# Patient Record
Sex: FEMALE | Race: WHITE
[De-identification: names, ages, dates, MRNs, and addresses within clinical notes are randomized per-mention and may not be internally consistent; named-entity substitution may affect disease eponyms.]

---

## 2017-08-07 ENCOUNTER — HOSPITAL ENCOUNTER (OUTPATIENT)
Dept: HOSPITAL 47 - RADMAMWWP | Age: 62
Discharge: HOME | End: 2017-08-07
Payer: COMMERCIAL

## 2017-08-07 DIAGNOSIS — Z12.31: Primary | ICD-10-CM

## 2017-08-08 NOTE — MM
Reason for exam: screening  (asymptomatic).

Last mammogram was performed 1 year and 7 months ago.



History:

Patient is postmenopausal.

Took hormonal contraceptives for 2 years.



Physical Findings:

A clinical breast exam by your physician is recommended on an annual basis and 

results should be correlated with mammographic findings.



MG Screening Mammo w CAD

Bilateral CC and MLO view(s) were taken.

Prior study comparison: January 20, 2016, bilateral MG screening mammo w CAD.  

November 27, 2013, bilateral digital screening mammo w/CAD.

The breast tissue is heterogeneously dense. This may lower the sensitivity of 

mammography.

Finding: There are typically benign dystrophic, round calcifications in both 

breasts. Asymmetric breast tissue in the right posterior upper outer quadrant is 

stable. There is no discrete abnormality.





ASSESSMENT: Benign, BI-RAD 2



RECOMMENDATION:

Routine screening mammogram of both breasts in 1 year.

## 2018-08-08 ENCOUNTER — HOSPITAL ENCOUNTER (OUTPATIENT)
Dept: HOSPITAL 47 - RADMAMWWP | Age: 63
Discharge: HOME | End: 2018-08-08
Attending: FAMILY MEDICINE
Payer: COMMERCIAL

## 2018-08-08 DIAGNOSIS — Z12.31: Primary | ICD-10-CM

## 2018-08-08 PROCEDURE — 77067 SCR MAMMO BI INCL CAD: CPT

## 2018-08-08 NOTE — MM
Reason for exam: screening  (asymptomatic).

Last mammogram was performed 1 year ago.



History:

Patient is postmenopausal.

Took hormonal contraceptives for 2 years.



Physical Findings:

A clinical breast exam by your physician is recommended on an annual basis and 

results should be correlated with mammographic findings.



MG Screening Mammo w CAD

Bilateral CC and MLO view(s) were taken.

Prior study comparison: August 7, 2017, bilateral MG screening mammo w CAD.  

January 20, 2016, bilateral MG screening mammo w CAD.

The breast tissue is heterogeneously dense. This may lower the sensitivity of 

mammography.  There is benign appearing round calcifications bilaterally. There is

no discrete abnormality.





ASSESSMENT: Benign, BI-RAD 2



RECOMMENDATION:

Routine screening mammogram of both breasts in 1 year.

## 2019-08-12 ENCOUNTER — HOSPITAL ENCOUNTER (OUTPATIENT)
Dept: HOSPITAL 47 - RADMAMWWP | Age: 64
Discharge: HOME | End: 2019-08-12
Attending: FAMILY MEDICINE
Payer: MEDICARE

## 2019-08-12 DIAGNOSIS — Z78.0: ICD-10-CM

## 2019-08-12 DIAGNOSIS — Z12.31: Primary | ICD-10-CM

## 2019-08-12 DIAGNOSIS — M85.851: ICD-10-CM

## 2019-08-12 PROCEDURE — 77067 SCR MAMMO BI INCL CAD: CPT

## 2019-08-12 PROCEDURE — 77063 BREAST TOMOSYNTHESIS BI: CPT

## 2019-08-12 PROCEDURE — 77080 DXA BONE DENSITY AXIAL: CPT

## 2019-08-12 NOTE — BD
EXAMINATION TYPE: Axial Bone Density

 

DATE OF EXAM: 8/12/2019

 

COMPARISON: 2013

 

CLINICAL HISTORY: 

 

Height:  62.5

Weight:  122.5

 

FRAX RISK QUESTIONS:

Alcohol (3 or more units per day):  no

Family History (Parent hip fracture):  no

Glucocorticoids (More than 3mos):  no

           (Ex: prednisone, prednisolone, methylprednisolone, dexamethasone, and hydrocortisone).    
     

History of Fracture in Adulthood: no

Secondary Osteoporosis: 

  1.  Type 1 Diabetes: no

  2.  Hyperthyroidism: no

  3.  Menopause before 45: no

  4.  Malnutrition: no

  5.  Chronic liver disease: no

Rheumatoid Arthritis: no

Current Tobacco Use: yes

 

RISK FACTORS 

HISTORY OF: 

Family History of Osteoporosis: not to knowledge of patient

Active: yes

Diet low in dairy products/other sources of calcium:  at least one serving a day

Postmenopausal woman: yes

Take estrogen and/or progesterone medications: not now

How long: hormonal contraceptives about 2 years

Lost more than 2 inches in height since high school: no

 

Frequent falls: no

Poor Health: no

Hyperparathyroidism: no

Adrenal Insufficiency: no

 

MEDICATIONS: 

Thyroid Medications:  no

Osteoporosis Medications: no

Additional Medications: multivitamin,  blood pressure med 

 

 

Additional History:

 

 

EXAM MEASUREMENTS: 

Bone mineral densitometry was performed using the [employer] System.

Bone mineral density as measured about the Lumbar spine is:  

----- L1-L4(G/cm2): 1.329

T Score Values are as follows:

----- L2: 1.5

----- L3: 0.6

----- L4: 2.2

----- L1-L4: 1.2

Bone mineral density has: Decreased -6.2% since study of: 11/27/2013

 

Bone mineral density about the R hip (g/cm2): 0.862

Bone mineral density about the L hip (g/cm2): 0.908

T Score values are as follows:

-----R Neck: -1.3

-----L Neck: -0.9

-----R Total: -1.2

-----L Total: -0.9

Bone mineral density has: Decreased -6.9% since study of: 11/27/2013

 

 

IMPRESSION:

Osteopenia right hip.

 

 

 

 

 

NOTE:  T-SCORE=SD OF THE YOUNG ADULT MEAN.

## 2019-08-13 NOTE — MM
Reason for exam: screening  (asymptomatic).

Last mammogram was performed 1 year ago.



History:

Patient is postmenopausal.

Took hormonal contraceptives for 2 years.



Physical Findings:

A clinical breast exam by your physician is recommended on an annual basis and 

results should be correlated with mammographic findings.



MG 3D Screening Mammo W/Cad

Bilateral CC and MLO view(s) were taken.

Prior study comparison: August 8, 2018, bilateral MG screening mammo w CAD.  

August 7, 2017, bilateral MG screening mammo w CAD.

The breast tissue is heterogeneously dense. This may lower the sensitivity of 

mammography.  There is no discrete abnormality.  No significant changes when 

compared with prior studies.





ASSESSMENT: Benign, BI-RAD 2



RECOMMENDATION:

Routine screening mammogram of both breasts in 1 year.

## 2020-05-14 ENCOUNTER — HOSPITAL ENCOUNTER (OUTPATIENT)
Dept: HOSPITAL 47 - PROCWHC3 | Age: 65
Discharge: HOME | End: 2020-05-14
Attending: INTERNAL MEDICINE
Payer: MEDICARE

## 2020-05-14 VITALS
HEART RATE: 81 BPM | SYSTOLIC BLOOD PRESSURE: 155 MMHG | RESPIRATION RATE: 16 BRPM | DIASTOLIC BLOOD PRESSURE: 82 MMHG | TEMPERATURE: 97.6 F

## 2020-05-14 DIAGNOSIS — C25.9: Primary | ICD-10-CM

## 2020-05-14 LAB
ALBUMIN SERPL-MCNC: 3 G/DL (ref 3.5–5)
ALP SERPL-CCNC: 155 U/L (ref 38–126)
ALT SERPL-CCNC: 14 U/L (ref 4–34)
ANION GAP SERPL CALC-SCNC: 10 MMOL/L
AST SERPL-CCNC: 29 U/L (ref 14–36)
BASOPHILS # BLD AUTO: 0.1 K/UL (ref 0–0.2)
BASOPHILS NFR BLD AUTO: 1 %
BUN SERPL-SCNC: 3 MG/DL (ref 7–17)
CALCIUM SPEC-MCNC: 8.4 MG/DL (ref 8.4–10.2)
CHLORIDE SERPL-SCNC: 99 MMOL/L (ref 98–107)
CO2 SERPL-SCNC: 26 MMOL/L (ref 22–30)
EOSINOPHIL # BLD AUTO: 0.2 K/UL (ref 0–0.7)
EOSINOPHIL NFR BLD AUTO: 2 %
ERYTHROCYTE [DISTWIDTH] IN BLOOD BY AUTOMATED COUNT: 3.5 M/UL (ref 3.8–5.4)
ERYTHROCYTE [DISTWIDTH] IN BLOOD: 14.4 % (ref 11.5–15.5)
GLUCOSE SERPL-MCNC: 156 MG/DL (ref 74–99)
HCT VFR BLD AUTO: 32 % (ref 34–46)
HGB BLD-MCNC: 10 GM/DL (ref 11.4–16)
LYMPHOCYTES # SPEC AUTO: 1.1 K/UL (ref 1–4.8)
LYMPHOCYTES NFR SPEC AUTO: 10 %
MAGNESIUM SPEC-SCNC: 1.6 MG/DL (ref 1.6–2.3)
MCH RBC QN AUTO: 28.5 PG (ref 25–35)
MCHC RBC AUTO-ENTMCNC: 31.1 G/DL (ref 31–37)
MCV RBC AUTO: 91.6 FL (ref 80–100)
MONOCYTES # BLD AUTO: 0.3 K/UL (ref 0–1)
MONOCYTES NFR BLD AUTO: 3 %
NEUTROPHILS # BLD AUTO: 9.5 K/UL (ref 1.3–7.7)
NEUTROPHILS NFR BLD AUTO: 84 %
PLATELET # BLD AUTO: 518 K/UL (ref 150–450)
POTASSIUM SERPL-SCNC: 3.8 MMOL/L (ref 3.5–5.1)
PROT SERPL-MCNC: 5.7 G/DL (ref 6.3–8.2)
SODIUM SERPL-SCNC: 135 MMOL/L (ref 137–145)
WBC # BLD AUTO: 11.3 K/UL (ref 3.8–10.6)

## 2020-05-14 PROCEDURE — 36591 DRAW BLOOD OFF VENOUS DEVICE: CPT

## 2020-05-14 PROCEDURE — 80053 COMPREHEN METABOLIC PANEL: CPT

## 2020-05-14 PROCEDURE — 85025 COMPLETE CBC W/AUTO DIFF WBC: CPT

## 2020-05-14 PROCEDURE — 83735 ASSAY OF MAGNESIUM: CPT

## 2020-08-22 ENCOUNTER — HOSPITAL ENCOUNTER (OUTPATIENT)
Dept: HOSPITAL 47 - RADPETMAIN | Age: 65
Discharge: HOME | End: 2020-08-22
Attending: INTERNAL MEDICINE
Payer: MEDICARE

## 2020-08-22 DIAGNOSIS — C25.8: Primary | ICD-10-CM

## 2020-08-22 PROCEDURE — 78815 PET IMAGE W/CT SKULL-THIGH: CPT

## 2020-08-24 NOTE — PE
EXAMINATION TYPE: PET CT fusion skull to thigh

 

DATE OF EXAM: 8/22/2020

 

COMPARISON: Outside CT abdomen and pelvis February 24, 2020.

 

HISTORY: Pancreatic cancer progress study.   Originally diagnosed in February, completed chemotherapy
 treatment Rosalee 15, 2020.

 

TECHNIQUE:  Following the intravenous administration of 10.16 mCi of F-18 FDG, whole body images are 
performed from the skull base to the midthigh.  Images are reviewed on the computer in the coronal, a
xial, and sagittal planes.  Reconstructed rotating images are created on independent workstation and 
reviewed on the computer.   A noncontrast CT is performed in conjunction with the PET scan.

 

SCAN: Subsequent Scan

 

FINDINGS: 

 

SKULL BASE AND NECK:  No areas of abnormal hypermetabolic uptake.

 

CHEST, MEDIASTINUM, AND HILAR REGION: Background mild underlying emphysematous change. No areas of ab
normal hypermetabolic uptake.

 

ABDOMEN AND PELVIS: Interval placement of metallic internal biliary stent with new pneumobilia. Inter
jazmyne improvement in degree of biliary and pancreatic ductal dilatation. Persistent abnormal soft tissu
e medially or deep to the stent with hypermetabolic uptake near axial image 141 through 147, difficul
t to accurately measure due to isointensity relative to adjacent pancreatic tissue, likely measuring 
2 to 2.5 cm in both AP and transverse dimensions. Max SUV is at this level is 3.71 on axial image 146
.

 

Normal excretion is present. No additional areas of suspicious hypermetabolic uptake.

 

OSSEOUS STRUCTURES: No areas of suspicious hypermetabolic uptake.

 

OTHER CT: There is right internal jugular Mediport catheter terminating near the cavoatrial junction.
 Mild to moderate right greater than left carotid bulb vascular calcification.

 

Ascending aortic aneurysm up to 4.0 cm axial image 85. Coronary artery calcification is present.

 

There is 2.6 cm simple appearing thin-walled cyst medially left kidney assessment 140 incidentally no
josué. There is ventral wall hernia containing fat and fluid above the umbilicus axial image 148. There
 is small fat-containing periumbilical hernia axial image 161. Sigmoid colonic diverticulosis. Scatte
red pelvic phleboliths.

 

IMPRESSION: Persistent active neoplasm at level of head/uncinate process. No new metastatic disease i
s evident. New Metallic internal biliary stent is patent with improved ductal distention.

## 2020-11-04 ENCOUNTER — HOSPITAL ENCOUNTER (OUTPATIENT)
Dept: HOSPITAL 47 - RADCTMAIN | Age: 65
Discharge: HOME | End: 2020-11-04
Attending: INTERNAL MEDICINE
Payer: MEDICARE

## 2020-11-04 DIAGNOSIS — Z96.89: ICD-10-CM

## 2020-11-04 DIAGNOSIS — K86.89: Primary | ICD-10-CM

## 2020-11-04 DIAGNOSIS — C25.9: ICD-10-CM

## 2020-11-04 LAB — BUN SERPL-SCNC: 15 MG/DL (ref 7–17)

## 2020-11-04 PROCEDURE — 84520 ASSAY OF UREA NITROGEN: CPT

## 2020-11-04 PROCEDURE — 71260 CT THORAX DX C+: CPT

## 2020-11-04 PROCEDURE — 82565 ASSAY OF CREATININE: CPT

## 2020-11-04 PROCEDURE — 36415 COLL VENOUS BLD VENIPUNCTURE: CPT

## 2020-11-04 PROCEDURE — 74177 CT ABD & PELVIS W/CONTRAST: CPT

## 2021-03-25 ENCOUNTER — HOSPITAL ENCOUNTER (OUTPATIENT)
Dept: HOSPITAL 47 - RADECHMAIN | Age: 66
Discharge: HOME | End: 2021-03-25
Attending: INTERNAL MEDICINE
Payer: MEDICARE

## 2021-03-25 DIAGNOSIS — Z01.818: Primary | ICD-10-CM

## 2021-03-25 DIAGNOSIS — I08.1: ICD-10-CM

## 2021-03-25 PROCEDURE — 93306 TTE W/DOPPLER COMPLETE: CPT

## 2021-03-26 NOTE — ECHOF
Referral Reason:[id number]



MEASUREMENTS

--------

HEIGHT: 157.5 cm

WEIGHT: 47.6 kg

BP: 

IVSd:   1.2 cm     (0.6 - 1.1)

LVIDd:   3.8 cm     (3.9 - 5.3)

LVPWd:   1.2 cm     (0.6 - 1.1)

IVSs:   1.3 cm

LVIDs:   2.5 cm

LVPWs:   1.5 cm

LAESV Index (A-L):   18.72 ml/m

Ao Diam:   3.2 cm     (2.0 - 3.7)

AV Cusp:   1.9 cm     (1.5 - 2.6)

LA Diam:   2.3 cm     (2.7 - 3.8)

MV EXCURSION:   11.800 mm     (> 18.000)

MV EF SLOPE:   66 mm/s     (70 - 150)

EPSS:   1.6 cm

MV E Clarence:   0.44 m/s

MV DecT:   206 ms

MV A Clarence:   0.69 m/s

MV E/A Ratio:   0.64 

RAP:   5.00 mmHg

RVSP:   13.29 mmHg







FINDINGS

--------

This was a technically good study.

The left ventricular size is normal.   There is borderline concentric left ventricular hypertrophy.  
 Overall left ventricular systolic function is low-normal with, an EF between 50 - 55 %.   The diasto
lic filling pattern is normal for the age of the patient 6.77.

The right ventricle is normal in size.

The left atrial size is normal.    Normal LA  size by volume 22+/-6 ml/m2.

The right atrial size is normal.

Interatrial and interventricular septum intact.

The aortic valve is trileaflet and appears structurally normal.

The mitral valve is normal.   The mitral valve leaflets are mildly thickened.   There is trace mitral
 regurgitation.

The tricuspid valve appears structurally normal.   Trace tricuspid regurgitation present.   Right robin
tricular systolic pressure is normal at < 35 mmHg.

There is no pulmonic regurgitation present.

The aortic root size is normal.

Normal inferior vena cava with normal inspiratory collapse consistent with estimated right atrial pre
ssure of  5 mmHg.

There is no pericardial effusion.



CONCLUSIONS

--------

1. The left ventricular size is normal.

2. There is borderline concentric left ventricular hypertrophy.

3. Overall left ventricular systolic function is low-normal with, an EF between 50 - 55 %.

4. The diastolic filling pattern is normal for the age of the patient 6.77

5. The mitral valve leaflets are mildly thickened.

6. There is trace mitral regurgitation.

7. Trace tricuspid regurgitation present.

8. There is no pericardial effusion.





SONOGRAPHER: Mandy Hector RDCS

## 2021-07-08 ENCOUNTER — HOSPITAL ENCOUNTER (OUTPATIENT)
Dept: HOSPITAL 47 - PROCWHC3 | Age: 66
End: 2021-07-08
Attending: INTERNAL MEDICINE
Payer: MEDICARE

## 2021-07-08 VITALS
HEART RATE: 72 BPM | DIASTOLIC BLOOD PRESSURE: 91 MMHG | SYSTOLIC BLOOD PRESSURE: 147 MMHG | RESPIRATION RATE: 16 BRPM | TEMPERATURE: 97.9 F

## 2021-07-08 DIAGNOSIS — E83.51: ICD-10-CM

## 2021-07-08 DIAGNOSIS — D64.81: Primary | ICD-10-CM

## 2021-07-08 LAB
BASOPHILS # BLD AUTO: 0 K/UL (ref 0–0.2)
BASOPHILS NFR BLD AUTO: 0 %
EOSINOPHIL # BLD AUTO: 0 K/UL (ref 0–0.7)
EOSINOPHIL NFR BLD AUTO: 0 %
ERYTHROCYTE [DISTWIDTH] IN BLOOD BY AUTOMATED COUNT: 3.14 M/UL (ref 3.8–5.4)
ERYTHROCYTE [DISTWIDTH] IN BLOOD: 15.1 % (ref 11.5–15.5)
HCT VFR BLD AUTO: 28.2 % (ref 34–46)
HGB BLD-MCNC: 10 GM/DL (ref 11.4–16)
LYMPHOCYTES # SPEC AUTO: 0.5 K/UL (ref 1–4.8)
LYMPHOCYTES NFR SPEC AUTO: 10 %
MCH RBC QN AUTO: 31.7 PG (ref 25–35)
MCHC RBC AUTO-ENTMCNC: 35.2 G/DL (ref 31–37)
MCV RBC AUTO: 90 FL (ref 80–100)
MONOCYTES # BLD AUTO: 0.3 K/UL (ref 0–1)
MONOCYTES NFR BLD AUTO: 6 %
NEUTROPHILS # BLD AUTO: 4.3 K/UL (ref 1.3–7.7)
NEUTROPHILS NFR BLD AUTO: 83 %
PLATELET # BLD AUTO: 153 K/UL (ref 150–450)
WBC # BLD AUTO: 5.2 K/UL (ref 3.8–10.6)

## 2021-07-08 PROCEDURE — 36591 DRAW BLOOD OFF VENOUS DEVICE: CPT

## 2021-07-08 PROCEDURE — 85025 COMPLETE CBC W/AUTO DIFF WBC: CPT

## 2021-07-08 PROCEDURE — 96366 THER/PROPH/DIAG IV INF ADDON: CPT

## 2021-07-08 PROCEDURE — 96365 THER/PROPH/DIAG IV INF INIT: CPT

## 2021-07-08 PROCEDURE — 96367 TX/PROPH/DG ADDL SEQ IV INF: CPT

## 2021-07-08 PROCEDURE — 83735 ASSAY OF MAGNESIUM: CPT

## 2021-07-08 RX ADMIN — SODIUM CHLORIDE, PRESERVATIVE FREE PRN ML: 5 INJECTION INTRAVENOUS at 15:32

## 2021-07-08 RX ADMIN — SODIUM CHLORIDE, PRESERVATIVE FREE PRN ML: 5 INJECTION INTRAVENOUS at 12:35

## 2021-09-20 ENCOUNTER — HOSPITAL ENCOUNTER (OUTPATIENT)
Dept: HOSPITAL 47 - PROCWHC3 | Age: 66
End: 2021-09-20
Attending: INTERNAL MEDICINE
Payer: MEDICARE

## 2021-09-20 VITALS
SYSTOLIC BLOOD PRESSURE: 159 MMHG | HEART RATE: 77 BPM | RESPIRATION RATE: 16 BRPM | DIASTOLIC BLOOD PRESSURE: 83 MMHG | TEMPERATURE: 98.2 F

## 2021-09-20 DIAGNOSIS — E83.42: Primary | ICD-10-CM

## 2021-09-20 PROCEDURE — 96365 THER/PROPH/DIAG IV INF INIT: CPT

## 2021-09-20 PROCEDURE — 96366 THER/PROPH/DIAG IV INF ADDON: CPT

## 2021-09-20 RX ADMIN — MAGNESIUM SULFATE IN DEXTROSE SCH MLS/HR: 10 INJECTION, SOLUTION INTRAVENOUS at 14:43

## 2021-09-20 RX ADMIN — MAGNESIUM SULFATE IN DEXTROSE SCH MLS/HR: 10 INJECTION, SOLUTION INTRAVENOUS at 16:05

## 2021-09-20 RX ADMIN — SODIUM CHLORIDE, PRESERVATIVE FREE PRN ML: 5 INJECTION INTRAVENOUS at 17:15

## 2021-09-20 RX ADMIN — SODIUM CHLORIDE, PRESERVATIVE FREE PRN ML: 5 INJECTION INTRAVENOUS at 14:45

## 2021-10-01 ENCOUNTER — HOSPITAL ENCOUNTER (OUTPATIENT)
Dept: HOSPITAL 47 - PROCWHC3 | Age: 66
End: 2021-10-01
Attending: INTERNAL MEDICINE
Payer: MEDICARE

## 2021-10-01 VITALS — DIASTOLIC BLOOD PRESSURE: 71 MMHG | TEMPERATURE: 98.2 F | SYSTOLIC BLOOD PRESSURE: 137 MMHG | HEART RATE: 77 BPM

## 2021-10-01 VITALS — RESPIRATION RATE: 16 BRPM

## 2021-10-01 DIAGNOSIS — T45.1X5A: ICD-10-CM

## 2021-10-01 DIAGNOSIS — D64.81: Primary | ICD-10-CM

## 2021-10-01 PROCEDURE — 86901 BLOOD TYPING SEROLOGIC RH(D): CPT

## 2021-10-01 PROCEDURE — 86920 COMPATIBILITY TEST SPIN: CPT

## 2021-10-01 PROCEDURE — 86900 BLOOD TYPING SEROLOGIC ABO: CPT

## 2021-10-01 PROCEDURE — 86850 RBC ANTIBODY SCREEN: CPT

## 2022-01-03 ENCOUNTER — HOSPITAL ENCOUNTER (OUTPATIENT)
Dept: HOSPITAL 47 - RADUSWWP | Age: 67
Discharge: HOME | End: 2022-01-03
Attending: INTERNAL MEDICINE
Payer: MEDICARE

## 2022-01-03 DIAGNOSIS — I82.812: Primary | ICD-10-CM

## 2022-02-01 ENCOUNTER — HOSPITAL ENCOUNTER (OUTPATIENT)
Dept: HOSPITAL 47 - RADUSWWP | Age: 67
Discharge: HOME | End: 2022-02-01
Attending: INTERNAL MEDICINE
Payer: MEDICARE

## 2022-02-01 ENCOUNTER — HOSPITAL ENCOUNTER (OUTPATIENT)
Dept: HOSPITAL 47 - LABWHC1 | Age: 67
Discharge: HOME | End: 2022-02-01
Attending: INTERNAL MEDICINE
Payer: MEDICARE

## 2022-02-01 DIAGNOSIS — N17.9: Primary | ICD-10-CM

## 2022-02-01 DIAGNOSIS — I82.812: Primary | ICD-10-CM

## 2022-02-01 LAB
ALBUMIN SERPL-MCNC: 3.6 G/DL (ref 3.8–4.9)
ALBUMIN/GLOB SERPL: 1.98 G/DL (ref 1.6–3.17)
ALP SERPL-CCNC: 183 U/L (ref 41–126)
ALT SERPL-CCNC: 67 U/L (ref 8–44)
ANION GAP SERPL CALC-SCNC: 10.5 MMOL/L (ref 10–18)
AST SERPL-CCNC: 58 U/L (ref 13–35)
BUN SERPL-SCNC: 20.3 MG/DL (ref 9–27)
BUN/CREAT SERPL: 13.27 RATIO (ref 12–20)
CALCIUM SPEC-MCNC: 8.6 MG/DL (ref 8.7–10.3)
CHLORIDE SERPL-SCNC: 102 MMOL/L (ref 96–109)
CO2 SERPL-SCNC: 22.5 MMOL/L (ref 20–27.5)
GLOBULIN SER CALC-MCNC: 1.8 G/DL (ref 1.6–3.3)
GLUCOSE SERPL-MCNC: 94 MG/DL (ref 70–110)
MAGNESIUM SPEC-SCNC: 1.6 MG/DL (ref 1.5–2.4)
POTASSIUM SERPL-SCNC: 4.1 MMOL/L (ref 3.5–5.5)
PROT SERPL-MCNC: 5.3 G/DL (ref 6.2–8.2)
SODIUM SERPL-SCNC: 135 MMOL/L (ref 135–145)

## 2022-02-01 PROCEDURE — 36415 COLL VENOUS BLD VENIPUNCTURE: CPT

## 2022-02-01 PROCEDURE — 83735 ASSAY OF MAGNESIUM: CPT

## 2022-02-01 PROCEDURE — 80053 COMPREHEN METABOLIC PANEL: CPT

## 2022-02-01 NOTE — US
EXAMINATION TYPE: US venous doppler duplex LE LT

 

DATE OF EXAM: 2/1/2022 1:11 PM

 

COMPARISON: US January 3, 2022

 

CLINICAL HISTORY: R22.42 Swelling of left lower limb. Pt states swelling left leg, has known GSV thro
mbus, pt currently on blood thinners 

 

SIDE PERFORMED: Left  

 

TECHNIQUE:  The lower extremity deep venous system is examined utilizing real time linear array sonog
michele with graded compression, doppler sonography and color-flow sonography.

 

VESSELS IMAGED:

Common Femoral Vein

Deep Femoral Vein

Greater Saphenous Vein *

Femoral Vein

Popliteal Vein

Small Saphenous Vein *

Proximal Calf Veins

(* superficial vessels)

 

 

 

Left Leg:  Negative for DVT, thrombus within left GSV near CFV junction, similar in appearance to pre
vious exam

 

Grayscale, color doppler, spectral doppler imaging performed of the deep veins of the left lower extr
emity.

 

IMPRESSION:  Persistent occlusive thrombus in the greater saphenous vein extending near junction of t
he common femoral vein, no significant change from prior study.

## 2022-02-04 ENCOUNTER — HOSPITAL ENCOUNTER (OUTPATIENT)
Dept: HOSPITAL 47 - ORWHC2ENDO | Age: 67
Discharge: HOME | End: 2022-02-04
Attending: INTERNAL MEDICINE
Payer: MEDICARE

## 2022-02-04 VITALS — TEMPERATURE: 98.9 F | RESPIRATION RATE: 16 BRPM

## 2022-02-04 VITALS — DIASTOLIC BLOOD PRESSURE: 71 MMHG | HEART RATE: 77 BPM | SYSTOLIC BLOOD PRESSURE: 147 MMHG

## 2022-02-04 VITALS — BODY MASS INDEX: 15.3 KG/M2

## 2022-02-04 DIAGNOSIS — D50.9: Primary | ICD-10-CM

## 2022-02-04 PROCEDURE — 88305 TISSUE EXAM BY PATHOLOGIST: CPT

## 2022-02-04 PROCEDURE — 43239 EGD BIOPSY SINGLE/MULTIPLE: CPT

## 2022-02-04 PROCEDURE — 45378 DIAGNOSTIC COLONOSCOPY: CPT

## 2022-02-04 NOTE — P.PCN
Date of Procedure: 02/04/22


Procedure(s) Performed: 


Brief history:


Patient is a pleasant 66-year-old white female scheduled for an elective upper 

endoscopy as well as colonoscopy as a part of evaluation of in deficiency 

anemia.  Patient was diagnosed with pancreatic adenocarcinoma 2 years ago status

post chemotherapy followed by Whipple surgery.  Since has been undergoing 

chemotherapy the last one was 2 months ago.  She has been having severe anemia 

requiring 8 units of PRBC transfusion the last 3 months.  She denies any rectal 

bleeding or melena.  However she does complain of intermittent dark colored 

stools.  She has been on a Eliquis for history of DVT.  This has been on hold 

for the last 2 days.





Procedure performed:


Esophagogastroduodenoscopy with biopsy


Colonoscopy





Preoperative diagnosis:


Iron deficiency anemia


Anesthesia: MAC





Procedure:


After informed consent was obtained from the patient  was brought into the 

endoscopy unit and IV  sedation was administered by anesthesia under continuous 

monitoring.  Initially upper endoscopy was done.  The Olympus  video 

endoscope was inserted inserted into the mouth and esophagus intubated without 

any difficulty and was gradually advanced into the stomach .  There was evidence

of distal antrectomy with Giovani-en-Y anastomosis.  The was advanced through the 

afferent and efferent loops.  It was advanced at least to 60 cm into the 

efferent loop and the mucosa appeared normal.  Biopsies were done from the 

jejunum to rule out celiac disease. normal.  l.  The scope was then withdrawn 

into the stomach adequately insufflated with air and upon careful examination  

the body, cardia and fundus appeared normal.  The scope was then withdrawn into 

the esophagus.  The GE junction was located at 40 cm to the incisors.  It 

appeared regular with no erythema erosions or ulcerations.  Rest of the 

esophagus appeared normal.  Patient tolerated the procedure well.





At this time the patient continued to remain sedation.  Initial digital rectal 

examination was normal.  Olympus  video colonoscope was then inserted into

the rectum and gradually advanced to the cecum with difficulty due to severe 

difficulty.  Careful examination was performed as the scope was gradually being 

withdrawn.  The prep was excellent.  The cecum, ascending colon, transverse 

colon, descending colon, sigmoid colon and rectum appeared normal.  Moderate 

sigmoid diverticulosis seen.  Retroflexion was performed in the rectum and no 

lesions were noted.  Patient tolerated the procedure well.





Impression:


1.   Upper endoscopy revealed evidence of distal antrectomy/Whipple surgery.  No

evidence of peptic ulcer disease or angiectasia


2.  Colonoscopy revealed moderate sigmoid diverticulosis and small internal 

hemorrhoids but no evidence of angiectasia, colitis or colorectal neoplasia








Recommendations:


Findings of this examination were discussed with the patient as well as[ her 

family.  She was advised to follow with the biopsy results.  Resume liquids 

today.  Follow up in office in a month.

## 2022-02-17 ENCOUNTER — HOSPITAL ENCOUNTER (OUTPATIENT)
Dept: HOSPITAL 47 - LABWHC1 | Age: 67
Discharge: HOME | End: 2022-02-17
Attending: NURSE PRACTITIONER
Payer: MEDICARE

## 2022-02-17 ENCOUNTER — HOSPITAL ENCOUNTER (OUTPATIENT)
Dept: HOSPITAL 47 - RADUSWWP | Age: 67
Discharge: HOME | End: 2022-02-17
Attending: INTERNAL MEDICINE
Payer: MEDICARE

## 2022-02-17 DIAGNOSIS — I82.812: Primary | ICD-10-CM

## 2022-02-17 DIAGNOSIS — K90.0: Primary | ICD-10-CM

## 2022-02-17 PROCEDURE — 36415 COLL VENOUS BLD VENIPUNCTURE: CPT

## 2022-02-17 PROCEDURE — 83516 IMMUNOASSAY NONANTIBODY: CPT

## 2022-02-17 NOTE — US
EXAMINATION TYPE: US venous doppler duplex LE LT

 

DATE OF EXAM: 2/17/2022 12:46 PM

 

COMPARISON: 2/1/2022

 

CLINICAL HISTORY: 66-year-old female I82.812 Embolism and thrombosis of superficial vein. Follow-up, 
history of GSV thrombus. Patient on blood thinner 

 

SIDE PERFORMED: left   

 

TECHNIQUE:  The lower extremity deep venous system is examined utilizing real time linear array sonog
michele with graded compression, doppler sonography and color-flow sonography.

 

FINDINGS:

 

VESSELS IMAGED:

Common Femoral Vein

Deep Femoral Vein

Greater Saphenous Vein *

Femoral Vein

Popliteal Vein

Small Saphenous Vein *

Proximal Calf Veins

(* superficial vessels)

 

 

 

Left Leg:  No evidence of DVT. Thrombus within left GSV near CFV junction as on prior exam 

 

 

 

IMPRESSION:

1. Exam positive for thrombus within the greater saphenous vein that extends to the junction with the
 common femoral vein as seen previously.

2. Otherwise, no evidence for DVT within the left lower extremity imaged from the groin to the upper 
calf.

## 2022-02-18 LAB
GLIADIN IGA SER-ACNC: 1.8 U/ML
GLIADIN IGG SER IA-ACNC: <0.4 U/ML
GLIADIN PEPTIDE IGA SER-ACNC: NEGATIVE
GLIADIN PEPTIDE IGG SER-ACNC: NEGATIVE
TIS TRANSGLUTAMINASE IGA UNIT: <0.5 AI
TIS TRANSGLUTAMINASE IGG UNIT: 0.9 U/ML

## 2022-03-31 ENCOUNTER — HOSPITAL ENCOUNTER (OUTPATIENT)
Dept: HOSPITAL 47 - LABWHC1 | Age: 67
Discharge: HOME | End: 2022-03-31
Attending: INTERNAL MEDICINE
Payer: MEDICARE

## 2022-03-31 DIAGNOSIS — D64.9: ICD-10-CM

## 2022-03-31 DIAGNOSIS — E21.3: Primary | ICD-10-CM

## 2022-03-31 DIAGNOSIS — N39.0: ICD-10-CM

## 2022-03-31 DIAGNOSIS — N17.9: ICD-10-CM

## 2022-03-31 LAB
ALBUMIN SERPL-MCNC: 4 G/DL (ref 3.8–4.9)
ALBUMIN/GLOB SERPL: 2.11 G/DL (ref 1.6–3.17)
ALP SERPL-CCNC: 166 U/L (ref 41–126)
ALT SERPL-CCNC: 53 U/L (ref 8–44)
ANION GAP SERPL CALC-SCNC: 12.6 MMOL/L (ref 10–18)
AST SERPL-CCNC: 44 U/L (ref 13–35)
BASOPHILS # BLD AUTO: 0.04 X 10*3/UL (ref 0–0.1)
BASOPHILS NFR BLD AUTO: 0.7 %
BUN SERPL-SCNC: 34.8 MG/DL (ref 9–27)
BUN/CREAT SERPL: 24.86 RATIO (ref 12–20)
CALCIUM SPEC-MCNC: 8.8 MG/DL (ref 8.7–10.3)
CHLORIDE SERPL-SCNC: 104 MMOL/L (ref 96–109)
CO2 SERPL-SCNC: 20.4 MMOL/L (ref 20–27.5)
EOSINOPHIL # BLD AUTO: 0.14 X 10*3/UL (ref 0.04–0.35)
EOSINOPHIL NFR BLD AUTO: 2.6 %
ERYTHROCYTE [DISTWIDTH] IN BLOOD BY AUTOMATED COUNT: 2.75 X 10*6/UL (ref 4.1–5.2)
ERYTHROCYTE [DISTWIDTH] IN BLOOD: 12.6 % (ref 11.5–14.5)
GLOBULIN SER CALC-MCNC: 1.9 G/DL (ref 1.6–3.3)
GLUCOSE SERPL-MCNC: 154 MG/DL (ref 70–110)
HCT VFR BLD AUTO: 28.3 % (ref 37.2–46.3)
HGB BLD-MCNC: 8.6 G/DL (ref 12–15)
IMM GRANULOCYTES BLD QL AUTO: 0.6 %
IRON SERPL-MCNC: 98 UG/DL (ref 50–170)
LYMPHOCYTES # SPEC AUTO: 0.97 X 10*3/UL (ref 0.9–5)
LYMPHOCYTES NFR SPEC AUTO: 18 %
MAGNESIUM SPEC-SCNC: 1.8 MG/DL (ref 1.5–2.4)
MCH RBC QN AUTO: 31.3 PG (ref 27–32)
MCHC RBC AUTO-ENTMCNC: 30.4 G/DL (ref 32–37)
MCV RBC AUTO: 102.9 FL (ref 80–97)
MONOCYTES # BLD AUTO: 0.31 X 10*3/UL (ref 0.2–1)
MONOCYTES NFR BLD AUTO: 5.7 %
NEUTROPHILS # BLD AUTO: 3.91 X 10*3/UL (ref 1.8–7.7)
NEUTROPHILS NFR BLD AUTO: 72.4 %
NRBC BLD AUTO-RTO: 0 /100 WBCS (ref 0–0)
PH UR: 5 [PH] (ref 5–8)
PLATELET # BLD AUTO: 477 X 10*3/UL (ref 140–440)
POTASSIUM SERPL-SCNC: 4.3 MMOL/L (ref 3.5–5.5)
PROT SERPL-MCNC: 5.9 G/DL (ref 6.2–8.2)
SODIUM SERPL-SCNC: 137 MMOL/L (ref 135–145)
SP GR UR: 1.02 (ref 1–1.03)
TIBC SERPL-MCNC: 293 UG/DL (ref 228–460)
URATE SERPL-MCNC: 6.3 MG/DL (ref 2.9–7.7)
UROBILINOGEN UR QL: 0.2
WBC # BLD AUTO: 5.4 X 10*3/UL (ref 4.5–10)

## 2022-03-31 PROCEDURE — 84100 ASSAY OF PHOSPHORUS: CPT

## 2022-03-31 PROCEDURE — 36415 COLL VENOUS BLD VENIPUNCTURE: CPT

## 2022-03-31 PROCEDURE — 81001 URINALYSIS AUTO W/SCOPE: CPT

## 2022-03-31 PROCEDURE — 83550 IRON BINDING TEST: CPT

## 2022-03-31 PROCEDURE — 83540 ASSAY OF IRON: CPT

## 2022-03-31 PROCEDURE — 82728 ASSAY OF FERRITIN: CPT

## 2022-03-31 PROCEDURE — 80053 COMPREHEN METABOLIC PANEL: CPT

## 2022-03-31 PROCEDURE — 85025 COMPLETE CBC W/AUTO DIFF WBC: CPT

## 2022-03-31 PROCEDURE — 83970 ASSAY OF PARATHORMONE: CPT

## 2022-03-31 PROCEDURE — 82306 VITAMIN D 25 HYDROXY: CPT

## 2022-03-31 PROCEDURE — 83735 ASSAY OF MAGNESIUM: CPT

## 2022-03-31 PROCEDURE — 84550 ASSAY OF BLOOD/URIC ACID: CPT

## 2022-04-27 ENCOUNTER — HOSPITAL ENCOUNTER (OUTPATIENT)
Dept: HOSPITAL 47 - RADUSWWP | Age: 67
Discharge: HOME | End: 2022-04-27
Attending: INTERNAL MEDICINE
Payer: MEDICARE

## 2022-04-27 DIAGNOSIS — I82.812: Primary | ICD-10-CM

## 2022-04-27 DIAGNOSIS — N18.32: Primary | ICD-10-CM

## 2022-04-27 PROCEDURE — 76770 US EXAM ABDO BACK WALL COMP: CPT

## 2022-04-27 NOTE — US
EXAMINATION TYPE: US kidneys/renal and bladder

 

DATE OF EXAM: 4/27/2022

 

COMPARISON: CT chest abdomen and pelvis November 4, 2020

 

CLINICAL HISTORY: N18.32 ckd.

 

EXAM MEASUREMENTS:

 

Right Kidney:  9.1 x 3.9 x 4.6 cm

Left Kidney: 9.8 x 5.4 x 5.1 cm

 

 

 

Right Kidney: shadowing echogenic foci measuring  0.6 x 0.4 x 0.4cm, possible stone

Left Kidney: 2.8 x 2.7 x 2.5cm midline cyst

Bladder: wnl as seen, patient felt her bladder very full

 

There is no evidence for hydronephrosis at this point in time. Technologist marks 5 mm shadowing hype
rechoic focus lower pole level right kidney possible nonobstructing calculus not clearly seen on prio
r CT. There is simple appearing 2.8 cm thin-walled cyst centrally midpole of the left kidney correlat
ing with coronal image 117 series 9.  The urinary bladder is adequately distended.  Bilateral uretera
l jets are and not seen.

 

 

 

IMPRESSION: No hydronephrosis is noted bilaterally.

## 2022-04-27 NOTE — US
EXAMINATION TYPE: US venous doppler duplex LE LT

 

DATE OF EXAM: 4/27/2022 3:28 PM

 

COMPARISON: Prior left leg venous ultrasound February 17, 2022

 

CLINICAL HISTORY: I82.812 EMBOLISM AND THROMBOSIS L LEG.

 

SIDE PERFORMED: Left  

 

TECHNIQUE:  The lower extremity deep venous system is examined utilizing real time linear array sonog
michele with graded compression, doppler sonography and color-flow sonography.

 

VESSELS IMAGED:

Common Femoral Vein

Deep Femoral Vein

Greater Saphenous Vein *

Femoral Vein

Popliteal Vein

Small Saphenous Vein *

Proximal Calf Veins

(* superficial vessels)

 

 

Left Leg:  Negative for DVT

 

Superficial thrombus seen just inferior to the CFV/GSV junction.

 

Grayscale, color doppler, spectral doppler imaging performed of the deep veins of the left lower extr
emity.  

 

IMPRESSION: Some improvement or recanalization of the maximal greater saphenous vein but with persist
ent thrombus within 2.0 cm of the junction with the left common femoral vein.

## 2022-06-28 ENCOUNTER — HOSPITAL ENCOUNTER (OUTPATIENT)
Dept: HOSPITAL 47 - LABWHC1 | Age: 67
Discharge: HOME | End: 2022-06-28
Attending: INTERNAL MEDICINE
Payer: MEDICARE

## 2022-06-28 DIAGNOSIS — N39.0: ICD-10-CM

## 2022-06-28 DIAGNOSIS — R80.9: ICD-10-CM

## 2022-06-28 DIAGNOSIS — E55.9: ICD-10-CM

## 2022-06-28 DIAGNOSIS — D64.9: ICD-10-CM

## 2022-06-28 DIAGNOSIS — M10.9: ICD-10-CM

## 2022-06-28 DIAGNOSIS — N25.81: Primary | ICD-10-CM

## 2022-06-28 DIAGNOSIS — N18.32: ICD-10-CM

## 2022-06-28 LAB
ALBUMIN SERPL-MCNC: 3.9 G/DL (ref 3.8–4.9)
ALBUMIN/GLOB SERPL: 2.05 G/DL (ref 1.6–3.17)
ALP SERPL-CCNC: 110 U/L (ref 41–126)
ALT SERPL-CCNC: 33 U/L (ref 8–44)
ANION GAP SERPL CALC-SCNC: 12.4 MMOL/L (ref 10–18)
AST SERPL-CCNC: 30 U/L (ref 13–35)
BUN SERPL-SCNC: 32.8 MG/DL (ref 9–27)
BUN/CREAT SERPL: 14.26 RATIO (ref 12–20)
CALCIUM SPEC-MCNC: 8.4 MG/DL (ref 8.7–10.3)
CHLORIDE SERPL-SCNC: 105 MMOL/L (ref 96–109)
CO2 SERPL-SCNC: 19.6 MMOL/L (ref 20–27.5)
ERYTHROCYTE [DISTWIDTH] IN BLOOD BY AUTOMATED COUNT: 2.7 X 10*6/UL (ref 4.1–5.2)
ERYTHROCYTE [DISTWIDTH] IN BLOOD: 13.4 % (ref 11.5–14.5)
FERRITIN SERPL-MCNC: 1240 NG/ML (ref 10–291)
GLOBULIN SER CALC-MCNC: 1.9 G/DL (ref 1.6–3.3)
GLUCOSE SERPL-MCNC: 162 MG/DL (ref 70–110)
HCT VFR BLD AUTO: 27.3 % (ref 37.2–46.3)
HGB BLD-MCNC: 8.3 G/DL (ref 12–15)
IRON SERPL-MCNC: 119 UG/DL (ref 50–170)
MAGNESIUM SPEC-SCNC: 1.7 MG/DL (ref 1.5–2.4)
MCH RBC QN AUTO: 30.7 PG (ref 27–32)
MCHC RBC AUTO-ENTMCNC: 30.4 G/DL (ref 32–37)
MCV RBC AUTO: 101.1 FL (ref 80–97)
NRBC BLD AUTO-RTO: 0 /100 WBCS (ref 0–0)
PH UR: 5 [PH] (ref 5–8)
PLATELET # BLD AUTO: 262 X 10*3/UL (ref 140–440)
POTASSIUM SERPL-SCNC: 4.5 MMOL/L (ref 3.5–5.5)
PROT SERPL-MCNC: 5.8 G/DL (ref 6.2–8.2)
SODIUM SERPL-SCNC: 137 MMOL/L (ref 135–145)
SP GR UR: 1.02 (ref 1–1.03)
TIBC SERPL-MCNC: 260 UG/DL (ref 228–460)
UROBILINOGEN UR QL: 0.2
WBC # BLD AUTO: 4.79 X 10*3/UL (ref 4.5–10)

## 2022-06-28 PROCEDURE — 82570 ASSAY OF URINE CREATININE: CPT

## 2022-06-28 PROCEDURE — 85027 COMPLETE CBC AUTOMATED: CPT

## 2022-06-28 PROCEDURE — 82728 ASSAY OF FERRITIN: CPT

## 2022-06-28 PROCEDURE — 84100 ASSAY OF PHOSPHORUS: CPT

## 2022-06-28 PROCEDURE — 82043 UR ALBUMIN QUANTITATIVE: CPT

## 2022-06-28 PROCEDURE — 36415 COLL VENOUS BLD VENIPUNCTURE: CPT

## 2022-06-28 PROCEDURE — 83540 ASSAY OF IRON: CPT

## 2022-06-28 PROCEDURE — 83550 IRON BINDING TEST: CPT

## 2022-06-28 PROCEDURE — 82306 VITAMIN D 25 HYDROXY: CPT

## 2022-06-28 PROCEDURE — 83970 ASSAY OF PARATHORMONE: CPT

## 2022-06-28 PROCEDURE — 81001 URINALYSIS AUTO W/SCOPE: CPT

## 2022-06-28 PROCEDURE — 83735 ASSAY OF MAGNESIUM: CPT

## 2022-06-28 PROCEDURE — 80053 COMPREHEN METABOLIC PANEL: CPT

## 2022-07-20 ENCOUNTER — HOSPITAL ENCOUNTER (OUTPATIENT)
Dept: HOSPITAL 47 - LABWHC1 | Age: 67
Discharge: HOME | End: 2022-07-20
Attending: INTERNAL MEDICINE
Payer: MEDICARE

## 2022-07-20 DIAGNOSIS — E55.9: ICD-10-CM

## 2022-07-20 DIAGNOSIS — D64.9: ICD-10-CM

## 2022-07-20 DIAGNOSIS — N18.32: ICD-10-CM

## 2022-07-20 DIAGNOSIS — M10.9: ICD-10-CM

## 2022-07-20 DIAGNOSIS — R80.9: ICD-10-CM

## 2022-07-20 DIAGNOSIS — Z00.01: Primary | ICD-10-CM

## 2022-07-20 DIAGNOSIS — N39.0: ICD-10-CM

## 2022-07-20 DIAGNOSIS — N25.81: ICD-10-CM

## 2022-07-20 LAB
ALBUMIN SERPL-MCNC: 4 G/DL (ref 3.8–4.9)
ALBUMIN/GLOB SERPL: 2.05 G/DL (ref 1.6–3.17)
ALP SERPL-CCNC: 137 U/L (ref 41–126)
ALT SERPL-CCNC: 94 U/L (ref 8–44)
ANION GAP SERPL CALC-SCNC: 14.1 MMOL/L (ref 10–18)
AST SERPL-CCNC: 56 U/L (ref 13–35)
BUN SERPL-SCNC: 35.1 MG/DL (ref 9–27)
BUN/CREAT SERPL: 16.4 RATIO (ref 12–20)
CALCIUM SPEC-MCNC: 8.9 MG/DL (ref 8.7–10.3)
CHLORIDE SERPL-SCNC: 101 MMOL/L (ref 96–109)
CO2 SERPL-SCNC: 21.8 MMOL/L (ref 20–27.5)
ERYTHROCYTE [DISTWIDTH] IN BLOOD BY AUTOMATED COUNT: 2.46 X 10*6/UL (ref 4.1–5.2)
ERYTHROCYTE [DISTWIDTH] IN BLOOD: 13.8 % (ref 11.5–14.5)
GLOBULIN SER CALC-MCNC: 2 G/DL (ref 1.6–3.3)
GLUCOSE SERPL-MCNC: 94 MG/DL (ref 70–110)
HCT VFR BLD AUTO: 25.5 % (ref 37.2–46.3)
HGB BLD-MCNC: 7.6 G/DL (ref 12–15)
IRON SERPL-MCNC: 62 UG/DL (ref 50–170)
MAGNESIUM SPEC-SCNC: 2 MG/DL (ref 1.5–2.4)
MCH RBC QN AUTO: 30.9 PG (ref 27–32)
MCHC RBC AUTO-ENTMCNC: 29.8 G/DL (ref 32–37)
MCV RBC AUTO: 103.7 FL (ref 80–97)
NRBC BLD AUTO-RTO: 0 /100 WBCS (ref 0–0)
PH UR: 5.5 [PH] (ref 5–8)
PLATELET # BLD AUTO: 225 X 10*3/UL (ref 140–440)
POTASSIUM SERPL-SCNC: 4.6 MMOL/L (ref 3.5–5.5)
PROT SERPL-MCNC: 5.9 G/DL (ref 6.2–8.2)
SODIUM SERPL-SCNC: 137 MMOL/L (ref 135–145)
SP GR UR: 1.01 (ref 1–1.03)
TIBC SERPL-MCNC: 273 UG/DL (ref 228–460)
URATE SERPL-MCNC: 6.7 MG/DL (ref 2.9–7.7)
UROBILINOGEN UR QL: 0.2
WBC # BLD AUTO: 4.46 X 10*3/UL (ref 4.5–10)

## 2022-07-20 PROCEDURE — 81050 URINALYSIS VOLUME MEASURE: CPT

## 2022-07-20 PROCEDURE — 83735 ASSAY OF MAGNESIUM: CPT

## 2022-07-20 PROCEDURE — 84156 ASSAY OF PROTEIN URINE: CPT

## 2022-07-20 PROCEDURE — 83540 ASSAY OF IRON: CPT

## 2022-07-20 PROCEDURE — 80053 COMPREHEN METABOLIC PANEL: CPT

## 2022-07-20 PROCEDURE — 36415 COLL VENOUS BLD VENIPUNCTURE: CPT

## 2022-07-20 PROCEDURE — 86334 IMMUNOFIX E-PHORESIS SERUM: CPT

## 2022-07-20 PROCEDURE — 80061 LIPID PANEL: CPT

## 2022-07-20 PROCEDURE — 84550 ASSAY OF BLOOD/URIC ACID: CPT

## 2022-07-20 PROCEDURE — 83970 ASSAY OF PARATHORMONE: CPT

## 2022-07-20 PROCEDURE — 86160 COMPLEMENT ANTIGEN: CPT

## 2022-07-20 PROCEDURE — 83550 IRON BINDING TEST: CPT

## 2022-07-20 PROCEDURE — 86255 FLUORESCENT ANTIBODY SCREEN: CPT

## 2022-07-20 PROCEDURE — 86162 COMPLEMENT TOTAL (CH50): CPT

## 2022-07-20 PROCEDURE — 86038 ANTINUCLEAR ANTIBODIES: CPT

## 2022-07-20 PROCEDURE — 83516 IMMUNOASSAY NONANTIBODY: CPT

## 2022-07-20 PROCEDURE — 84100 ASSAY OF PHOSPHORUS: CPT

## 2022-07-20 PROCEDURE — 82570 ASSAY OF URINE CREATININE: CPT

## 2022-07-20 PROCEDURE — 85027 COMPLETE CBC AUTOMATED: CPT

## 2022-07-20 PROCEDURE — 82728 ASSAY OF FERRITIN: CPT

## 2022-07-20 PROCEDURE — 81001 URINALYSIS AUTO W/SCOPE: CPT

## 2022-07-20 PROCEDURE — 86225 DNA ANTIBODY NATIVE: CPT

## 2022-07-20 PROCEDURE — 82306 VITAMIN D 25 HYDROXY: CPT

## 2022-07-20 PROCEDURE — 82043 UR ALBUMIN QUANTITATIVE: CPT

## 2022-07-20 PROCEDURE — 83883 ASSAY NEPHELOMETRY NOT SPEC: CPT

## 2022-07-21 LAB
C-ANCA TITR SER: (no result) TITER
CHOLEST SERPL-MCNC: 132 MG/DL (ref 0–200)
DSDNA AB SER QL: NEGATIVE
DSDNA AB TITR SER: <1 IU/ML
FERRITIN SERPL-MCNC: 1502 NG/ML (ref 10–291)
HDLC SERPL-MCNC: 58.7 MG/DL (ref 40–60)
KAPPA LC FREE SER NEPH-MCNC: 6.66 MG/DL (ref 0.33–1.94)
LAMBDA LC FREE SER NEPH-MCNC: 3.19 MG/DL (ref 0.57–2.63)
LDLC SERPL CALC-MCNC: 52.9 MG/DL (ref 0–131)
PROT 24H UR-MRATE: 13.6 MG/DL (ref 0–165)
SPECIMEN VOL 24H UR: 1100 ML
TRIGL SERPL-MCNC: 102 MG/DL (ref 0–149)
VLDLC SERPL CALC-MCNC: 20.4 MG/DL (ref 5–40)

## 2022-08-11 ENCOUNTER — HOSPITAL ENCOUNTER (OUTPATIENT)
Dept: HOSPITAL 47 - RADMAMWWP | Age: 67
Discharge: HOME | End: 2022-08-11
Attending: FAMILY MEDICINE
Payer: MEDICARE

## 2022-08-11 DIAGNOSIS — Z12.31: Primary | ICD-10-CM

## 2022-08-11 DIAGNOSIS — M81.0: ICD-10-CM

## 2022-08-11 PROCEDURE — 77080 DXA BONE DENSITY AXIAL: CPT

## 2022-08-11 PROCEDURE — 77067 SCR MAMMO BI INCL CAD: CPT

## 2022-08-11 PROCEDURE — 77063 BREAST TOMOSYNTHESIS BI: CPT

## 2022-08-11 NOTE — BD
EXAMINATION TYPE: Axial Bone Density

 

DATE OF EXAM: 8/11/2022

 

CLINICAL HISTORY: 67 years year old Female.  ICD-10 CODE: M89.9 DISORDER OF BONE

 

Height:  5 FT 2 1/2 N

Weight:  100

 

FRAX RISK QUESTIONS:

Alcohol (3 or more units per day):  NO

Family History (Parent hip fracture):  NO

Glucocorticoids (More than 3mos):  NO

           (Ex: prednisone, prednisolone, methylprednisolone, dexamethasone, and hydrocortisone).    
     

History of Fracture in Adulthood: NO

Secondary Osteoporosis:

  1.  Type 1 Diabetes: NO

  2.  Hyperthyroidism: NO

  3.  Menopause before 45: NO

  4.  Malnutrition: NO

  5.  Chronic liver disease: NO

Rheumatoid Arthritis: NO

Current Tobacco Use: FORMER

 

RISK FACTORS 

HISTORY OF: 

Surgery to Spine/Hip(right/left)/Wrist (right/left): NO

Family History of Osteoporosis: NO

Active: YES

Diet low in dairy products/other sources of calcium:  NO

Postmenopausal woman: YES

Take estrogen and/or progesterone medications: NO

Lost more than 2 inches in height since high school: YES

Frequent falls: NO

Poor Health: PANCREATIC CANCER

Hyperparathyroidism: NO

Adrenal Insufficiency: KIDNEY DISEASE SINCE HER LAST CHEMO

 

MEDICATIONS: 

Additional Medications: METOPROLOL, OMEPRAZOLE, LOTI MAL, CALCIUM, GABAPENTIN, SODIUM BICARB, MAGNESI
UM, VIT D, 

 

 

Additional History: PANCREATIC CANCER CHEMO 2021

 

 

EXAM MEASUREMENTS: 

Bone mineral densitometry was performed using the [employer] System.

Bone mineral density as measured about the Lumbar spine is:  

----- L1-L4(G/cm2): 1.150

T Score Values are as follows:

----- L1: -0.6

----- L2: 0.4

----- L3: -0.4

----- L4: -0.5

----- L1-L4: -0.3

Bone mineral density has: DECREASED  -19.2 % since study of: 2013

 

Bone mineral density about the R hip (g/cm2): 0.648

Bone mineral density about the L hip (g/cm2): 0.752

T Score values are as follows:

-----R Neck: -2.8

-----L Neck: -2.1

-----R Total: -2.9

-----L Total: -2.2

Bone mineral density has: DECREASED  -26.7 % since study of: 2013

 

 

FRAX%s: The graph provided illustrates a 13.7 % chance for a major osteoporotic fx and a 4.2 % chance
 for the hips probability for fx in 10 years time.

 

IMPRESSION:

Osteoporosis (T Score less than -2.5).

 

There is increased fracture risk and therapy is usually indicated based on age.

 

Re-Screen 1-2 years.

 

NOTE:  T-SCORE=SD OF THE YOUNG ADULT MEAN.

## 2022-08-12 NOTE — MM
Reason for Exam: Screening  (asymptomatic). 

Last mammogram was performed 3 year(s) and 0 month(s) ago. 





Patient History: 

Menarche at age 13. First Full-Term Pregnancy at age 19. Postmenopausal. Hormonal Contraceptives for

2 years until age 24. 





Risk Values: 

Blaire 5 year model risk: 1.4%.

NCI Lifetime model risk: 4.6%.





Prior Study Comparison: 

8/7/2017 Bilateral Screening Mammogram, [facility]. 8/8/2018 Bilateral Screening Mammogram, [facility]. 8/12/2019

Bilateral Screening Mammogram, [facility]. 





Tissue Density: 

The breast tissue is heterogeneously dense. This may lower the sensitivity of mammography.





Findings: 

Analyzed By CAD. 

There is a new group of heterogenous calcifications in the upper outer aspect left breast, an

interval change from 2019. Additional workup with vein indication views is recommended.



Benign calcifications are present bilaterally. Parenchymal pattern is stable. 





Overall Assessment: Incomplete: need additional imaging evaluation, BI-RAD 0





Management: 

Diagnostic Mammogram of the left breast.

A negative mammogram report should not preclude additional follow up of suspicious palpable

abnormalities.

Patient should continue monthly self breast exam.

A clinical breast exam by your physician is recommended on an annual basis and results should be

correlated with mammographic findings.



Electronically signed and approved by: Low Roberts D.O. Radiologis

## 2022-08-30 ENCOUNTER — HOSPITAL ENCOUNTER (OUTPATIENT)
Dept: HOSPITAL 47 - RADMAMWWP | Age: 67
Discharge: HOME | End: 2022-08-30
Attending: FAMILY MEDICINE
Payer: MEDICARE

## 2022-08-30 DIAGNOSIS — Z78.0: ICD-10-CM

## 2022-08-30 DIAGNOSIS — R92.8: Primary | ICD-10-CM

## 2022-08-30 PROCEDURE — 77061 BREAST TOMOSYNTHESIS UNI: CPT

## 2022-08-30 PROCEDURE — 77065 DX MAMMO INCL CAD UNI: CPT

## 2022-08-30 NOTE — MM
Reason for Exam: Additional evaluation requested from abnormal screening. 

Last screening mammogram was performed less than 1 month ago.





Patient History: 

Menarche at age 13. First Full-Term Pregnancy at age 19. Postmenopausal. Hormonal Contraceptives for

2 years until age 24. 





Risk Values: 

Blaire 5 year model risk: 1.4%.

NCI Lifetime model risk: 4.6%.





Prior Study Comparison: 

8/8/2018 Bilateral Screening Mammogram, [facility]. 8/12/2019 Bilateral Screening Mammogram, [facility]. 8/11/2022

Bilateral MG 3D screening mammo w/cad, [facility]. 





Tissue Density: 

Left: The breast tissue is heterogeneously dense. This may lower the sensitivity of mammography.





Findings: 

Analyzed By CAD. 

Group of fine pleomorphic calcifications demonstrated within the left breast upper outer quadrant

posterior depth. Additional grouped coarse heterogenous calcifications demonstrated within the

medial upper left breast posterior depth. No suspicious mass. 





Overall Assessment: Suspicious, BI-RAD 4





Management: 

Stereotactic Core Biopsy of the left breast.

A clinical breast exam by your physician is recommended on an annual basis and results should be

correlated with mammographic findings.  This exam should not preclude additional follow-up of

suspicious palpable abnormalities.  Results were given to the patient verbally at the time of exam.



Electronically signed and approved by: Colin Cardoso D.O.

## 2022-09-29 ENCOUNTER — HOSPITAL ENCOUNTER (OUTPATIENT)
Dept: HOSPITAL 47 - WWCWWP | Age: 67
Discharge: HOME | End: 2022-09-29
Attending: SURGERY
Payer: MEDICARE

## 2022-09-29 ENCOUNTER — HOSPITAL ENCOUNTER (OUTPATIENT)
Dept: HOSPITAL 47 - RADMAMWWP | Age: 67
End: 2022-09-29
Attending: SURGERY
Payer: MEDICARE

## 2022-09-29 VITALS — SYSTOLIC BLOOD PRESSURE: 138 MMHG | TEMPERATURE: 98.1 F | HEART RATE: 65 BPM | DIASTOLIC BLOOD PRESSURE: 78 MMHG

## 2022-09-29 VITALS — RESPIRATION RATE: 16 BRPM

## 2022-09-29 DIAGNOSIS — R92.8: ICD-10-CM

## 2022-09-29 DIAGNOSIS — Z53.9: Primary | ICD-10-CM

## 2022-09-29 DIAGNOSIS — N60.82: Primary | ICD-10-CM

## 2022-09-29 PROCEDURE — 19081 BX BREAST 1ST LESION STRTCTC: CPT

## 2022-09-29 PROCEDURE — 19082 BX BREAST ADD LESION STRTCTC: CPT

## 2022-09-29 PROCEDURE — 88305 TISSUE EXAM BY PATHOLOGIST: CPT

## 2022-09-29 NOTE — P.PCN
Date of Procedure: 09/29/22


Preoperative Diagnosis: 


Stereotactic core biopsy 2 sites in the left breast upper outer quadrant region


Postoperative Diagnosis: 


Same


Procedure(s) Performed: 


Stereotactic core biopsy 2 sites left breast upper outer quadrant


Anesthesia: local


Surgeon: Donna Polo


Pathology: other (Breast tissue site in his posterior site microcalcifications 

of concern included, breast tissue site B was anterior site microcalcifications 

of concern noted and specimen)


Condition: stable


Disposition: same day


Indications for Procedure: 


Microcalcifications of concern 2 sites left breast upper outer quadrant


Operative Findings: 


Microcalcifications of concern noted and biopsy specimen for both sites


Description of Procedure: 


The patient was seen and examined the procedure.  Risk and benefits of the 

procedure were discussed with the patient risk include but were not limited to 

bleeding, infection, reaction to the anesthetic.  The patient understood and 

wished to proceed.  Alternatives such as watchful waiting or resection in the 

operating room before considered not recommended.





The patient was brought to the stereotactic core biopsy room.  She was 

positioned prone on the lo rad table.  The posterior lesion was addressed 

initially.   A medial to lateral approach was utilized.  A  film was 

obtained.  The lesion of concern was identified.  The lesion was targeted.  The 

breast was prepped using Betadine.  A 9-gauge vacuum-assisted core rotating 

Petit biopsy needle was chosen.  12 mL of 1% lidocaine were used to anesthetize 

the area of concern.  The needle was driven to the correct coordinates.  The 

needle was fired.  The needle was noted to be in the correct location on a post-

fire film.  13 core biopsy specimens were obtained.  Radiograph of the specimen 

revealed the calcifications of concern had been obtained.  A tri sergio clip was 

placed.  The clip was noted to be in the correct location.





The patient was repositioned.  A lateral to medial approach was utilized for the

anterior lesion.  A  film was obtained.  The lesion of concern was 

identified.  The lesion was targeted.  The breast was again prepped with 

Betadine.  12 mL of 1% lidocaine were used to anesthetize the area of concern.  

A 9-gauge vacuum-assisted core biopsy needle was driven to the correct 

coordinates.  The needle was fired.  Post fire film was obtained and the needle 

was slightly advanced with additional views of the needle to be in the correct 

location.  13 core biopsy specimens were obtained.  Radiograph of the specimen 

revealed the calcifications of concern had been obtained.  A secure sergio clip 

was placed.  The specimens are sent to pathology.  The patient will follow-up 

with Dr. Gomez in 1 week.  The clip was noted to be in the correct location.

## 2022-09-29 NOTE — MM
Date of Procedure: 09/29/22

Preoperative Diagnosis: 

Stereotactic core biopsy 2 sites in the left breast upper outer quadrant region

Postoperative Diagnosis: 

Same

Procedure(s) Performed: 

Stereotactic core biopsy 2 sites left breast upper outer quadrant

Anesthesia: local

Surgeon: Donna Polo

Pathology: other (Breast tissue site in his posterior site microcalcifications 
of concern included, breast tissue site B was anterior site microcalcifications 
of concern noted and specimen)

Condition: stable

Disposition: same day

Indications for Procedure: 

Microcalcifications of concern 2 sites left breast upper outer quadrant

Operative Findings: 

Microcalcifications of concern noted and biopsy specimen for both sites

Description of Procedure: 

The patient was seen and examined the procedure.  Risk and benefits of the 
procedure were discussed with the patient risk include but were not limited to 
bleeding, infection, reaction to the anesthetic.  The patient understood and 
wished to proceed.  Alternatives such as watchful waiting or resection in the 
operating room before considered not recommended.



The patient was brought to the stereotactic core biopsy room.  She was 
positioned prone on the lo rad table.  The posterior lesion was addressed 
initially.   A medial to lateral approach was utilized.  A  film was 
obtained.  The lesion of concern was identified.  The lesion was targeted.  The 
breast was prepped using Betadine.  A 9-gauge vacuum-assisted core rotating 
Petit biopsy needle was chosen.  12 mL of 1% lidocaine were used to anesthetize 
the area of concern.  The needle was driven to the correct coordinates.  The 
needle was fired.  The needle was noted to be in the correct location on a post-
fire film.  13 core biopsy specimens were obtained.  Radiograph of the specimen 
revealed the calcifications of concern had been obtained.  A tri sergio clip was 
placed.  The clip was noted to be in the correct location.



The patient was repositioned.  A lateral to medial approach was utilized for the
anterior lesion.  A  film was obtained.  The lesion of concern was 
identified.  The lesion was targeted.  The breast was again prepped with 
Betadine.  12 mL of 1% lidocaine were used to anesthetize the area of concern.  
A 9-gauge vacuum-assisted core biopsy needle was driven to the correct 
coordinates.  The needle was fired.  Post fire film was obtained and the needle 
was slightly advanced with additional views of the needle to be in the correct 
location.  13 core biopsy specimens were obtained.  Radiograph of the specimen 
revealed the calcifications of concern had been obtained.  A secure sergio clip 
was placed.  The specimens are sent to pathology.  The patient will follow-up 
with Dr. Gomez in 1 week.  The clip was noted to be in the correct location.



Following the procedure a 2 view mammogram was obtained.  The secure sergio Top-
Hat clip was visualized however the tri sergio clip was not visualized.  This was 
reviewed with radiology and a few residual calcifications were felt to be 
present at that site should be necessary to localize that site for resection in 
the operating room.  Again the tri sergio clip was not visualized which was the 
posterior site biopsy.  The anterior site was marked with the secure sergio Top-
Hat clip and this was noted to be in the correct location.


RAHEL

## 2022-09-29 NOTE — MM
Date of Procedure: 09/29/22

Preoperative Diagnosis: 

Stereotactic core biopsy 2 sites in the left breast upper outer quadrant region

Postoperative Diagnosis: 

Same

Procedure(s) Performed: 

Stereotactic core biopsy 2 sites left breast upper outer quadrant

Anesthesia: local

Surgeon: Donna Polo

Pathology: other (Breast tissue site in his posterior site microcalcifications 
of concern included, breast tissue site B was anterior site microcalcifications 
of concern noted and specimen)

Condition: stable

Disposition: same day

Indications for Procedure: 

Microcalcifications of concern 2 sites left breast upper outer quadrant

Operative Findings: 

Microcalcifications of concern noted and biopsy specimen for both sites

Description of Procedure: 

The patient was seen and examined the procedure.  Risk and benefits of the 
procedure were discussed with the patient risk include but were not limited to 
bleeding, infection, reaction to the anesthetic.  The patient understood and 
wished to proceed.  Alternatives such as watchful waiting or resection in the 
operating room before considered not recommended.



The patient was brought to the stereotactic core biopsy room.  She was 
positioned prone on the lo rad table.  The posterior lesion was addressed 
initially.   A medial to lateral approach was utilized.  A  film was 
obtained.  The lesion of concern was identified.  The lesion was targeted.  The 
breast was prepped using Betadine.  A 9-gauge vacuum-assisted core rotating 
Petit biopsy needle was chosen.  12 mL of 1% lidocaine were used to anesthetize 
the area of concern.  The needle was driven to the correct coordinates.  The 
needle was fired.  The needle was noted to be in the correct location on a post-
fire film.  13 core biopsy specimens were obtained.  Radiograph of the specimen 
revealed the calcifications of concern had been obtained.  A tri sergio clip was 
placed.  The clip was noted to be in the correct location.



The patient was repositioned.  A lateral to medial approach was utilized for the
anterior lesion.  A  film was obtained.  The lesion of concern was 
identified.  The lesion was targeted.  The breast was again prepped with 
Betadine.  12 mL of 1% lidocaine were used to anesthetize the area of concern.  
A 9-gauge vacuum-assisted core biopsy needle was driven to the correct 
coordinates.  The needle was fired.  Post fire film was obtained and the needle 
was slightly advanced with additional views of the needle to be in the correct 
location.  13 core biopsy specimens were obtained.  Radiograph of the specimen 
revealed the calcifications of concern had been obtained.  A secure sergio clip 
was placed.  The specimens are sent to pathology.  The patient will follow-up 
with Dr. Gomez in 1 week.  The clip was noted to be in the correct location.


RAHEL

## 2022-09-29 NOTE — P.GSHP
History of Present Illness


H&P Date: 09/29/22


Chief Complaint: Mammographic abnormality left breast





     Torie is a 67-year-old white female seen in consultation for Dr. Morgan 

regarding the mammographic abnormality in the left breast.  She had a routine 

screening [place] [address] after which additional views of the 

left breast were recommended.  These were performed on 8/3022.  

Microcalcifications of concern were noted in the left breast in the upper outer 

quadrant.  2 clips were identified as reviewed by Dr. Roberts and recommended 

for stereotactic core biopsy.  She does not feel any lumps masses or nodules of 

concern in either breast.  This was a routine screening mammogram.  She has not 

had any prior surgery on her breasts.  She has not had any trauma or infection 

in her breast.





The patient was diagnosed with pancreatic cancer in 2021, she had a whipple 

procedure at [facility], she had chemotherapy. She did not have radiation therapy.

She was told she was tumor free. It started in the bile duct. 





Caffeine: 1 cup coffee/day


nicotine: stopped June 2020, used to smoke 1PPD for > 20 years; diagnosed with 

pancreatic cancer


chocolate: weekly


BCP: 2 years in her 20's





Family History: 13 siblings


brother: colon cancer


sister: colon cancer


brother: bone cancer


mother: stomach cancer





patient had genetic testing and told (-)





Hormonal History:


menarche: 14


[id number],   breast fed: no, age at first birth: 19


menopause: 49


hormones: none





Surgeries: 


tubal


Whipple








Medical History:


Last 2 PET scans a spot on L3L4


follows with Dr. Cisneros


kidney disease








SociaL History:


nicotine: stopped 2020


alcohol: none


drugs: none





























- Constitutional


Constitutional: Denies chills, Denies fever





- EENT


Eyes: denies blurred vision, denies pain


Ears: deny: decreased hearing, tinnitus


Ears, nose, mouth and throat: Denies headache, Denies sore throat





- Breasts


Breasts: bilateral: as per HPI





- Cardiovascular


Cardiovascular: Denies chest pain, Denies shortness of breath





- Respiratory


Comment: 





former smoker





- Gastrointestinal


Comment: 





bile duct cancer resected


Gastrointestinal: Reports as per HPI





- Genitourinary (Female)


Genitourinary: Denies dysuria, Denies hematuria





- Menstruation


Menstruation: Reports postmenopausal





- Musculoskeletal


Musculoskeletal: Denies myalgias





- Integumentary


Integumentary: Denies pruritus, Denies rash





- Neurological


Comment: 





neuropathy from chemotherapy


Neurological: Reports numbness, Denies weakness





- Psychiatric


Psychiatric: Denies anxiety, Denies depression





- Endocrine


Endocrine: Denies fatigue, Denies weight change





- Hematologic/Lymphatic


Comment: 





 eliquis from a superficial blood clot in the left groin, stopped on Tuesday





- Allergic/Immunologic


Allergic/Immunologic: Reports as per HPI





Past Medical History


Past Medical History: Cancer, GERD/Reflux, Hypertension


Additional Past Medical History / Comment(s): PANCREATIC CANCER DIAGNOSED 

2/2020, LAST CHEMO OCT /2021., INFUSA-PORT RIGHT CHEST, DVT LEFT LEG (DIAGNOSED 

JAN 2022)., HX OF DIVERTICULITIS., HX OF BLOOD TRANSFUSIONS (LAST OCT 2021)., 

DIARRHEA.


History of Any Multi-Drug Resistant Organisms: None Reported


Past Surgical History: Tubal Ligation


Additional Past Surgical History / Comment(s): vein stripping., biliary duct 

stent ., INFUSA-PORT RIGHT CHEST.,.  WHIPPLE PROCEDURE(March 4, 2021)


Past Anesthesia/Blood Transfusion Reactions: No Reported Reaction


Past Psychological History: No Psychological Hx Reported


Smoking Status: Former smoker


Past Alcohol Use History: None Reported


Additional Past Alcohol Use History / Comment(s): quit smoking June 2020, hx of 

2 ppd.


Past Drug Use History: None Reported





- Past Family History


  ** Brother(s)


Family Medical History: Cancer


Additional Family Medical History / Comment(s): colon cancer





  ** Sister(s)


Family Medical History: Cancer


Additional Family Medical History / Comment(s): colon cancer





  ** Mother


Family Medical History: Cancer


Additional Family Medical History / Comment(s): stomach cancer





Medications and Allergies


                                Home Medications











 Medication  Instructions  Recorded  Confirmed  Type


 


Acetaminophen [Tylenol Extra 1,000 mg PO AS DIRECTED PRN 02/01/22 09/29/22 

History





Strength]    


 


Apixaban [Eliquis] 5 mg PO BID 02/01/22 09/29/22 History


 


Calcium Carbonate [Calcium] 600 mg PO DAILY 02/01/22 09/29/22 History


 


Cyanocobalamin [Vitamin B-12] 1,000 mcg PO DAILY 02/01/22 09/29/22 History


 


Diphenox-Atrop 2.5-0.025 mg 1 tab PO AS DIRECTED PRN 02/01/22 09/29/22 History





[Lomotil]    


 


Iron 28 mg PO DAILY 02/01/22 09/29/22 History


 


L.acidoph,Paracasei, B.lactis 1 each PO DAILY 02/01/22 09/29/22 History





[Probiotic]    


 


Lipase/Protease/Amylase [Creon Dr 2 capsule PO BID-W/MEALS 02/01/22 09/29/22 

History





24,000 Unit Capsule]    


 


Loperamide [Imodium] 2 mg PO AS DIRECTED PRN 02/01/22 09/29/22 History


 


Magnesium Oxide [Mag-Ox] 400 mg PO TID 02/01/22 09/29/22 History


 


Omeprazole 20 mg PO DAILY 02/01/22 09/29/22 History


 


Calcium Acetate 667 mg PO DAILY 09/09/22 09/29/22 History


 


Folic Acid 1 mg PO DAILY 09/09/22 09/29/22 History


 


Gabapentin [Neurontin] 100 mg PO TID 09/09/22 09/29/22 History


 


Metoprolol Succinate (ER) [Toprol 50 mg PO DAILY 09/09/22 09/29/22 History





Xl]    


 


Sodium Bicarbonate 650 mg PO BID 09/09/22 09/29/22 History


 


calcitrioL [Calcitriol] 0.5 mcg PO WEEKLY 09/09/22 09/29/22 History


 


hydrALAZINE HCL [Apresoline] 50 mg PO TID 09/09/22 09/29/22 History








                                    Allergies











Allergy/AdvReac Type Severity Reaction Status Date / Time


 


No Known Allergies Allergy   Verified 09/29/22 07:35














Surgical - Exam





- General


no distress





- Eyes


normal ocular movement





- Neck


trachea midline





- Respiratory


normal respiratory effort, clear to auscultation





- Cardiovascular


Rhythm: regular


Heart Sounds: normal: S1, S2





- Abdomen


Abdomen: soft, non tender, no guarding, no rigid, no rebound





- Integumentary





normal turgor





- Neurologic


no disoriented, no combative





- Musculoskeletal


normal gait, normal posture





- Psychiatric


oriented to time, oriented to person, oriented to place, speech is normal, 

memory intact





Breast Exam:


BRA: medium sports bra


inspection: bilateral grade 2 ptosis


Palpation:


Right breast: Multiple positional exam fibrocystic changes no dominant masses or

 nodules of concern


Right axilla: No adenopathy of concern


Left breast: Multi-positional exam fibrocystic changes no dominant masses or 

nodules of concern


Left axilla: No adenopathy of concern





Results





Mammogram reviewed with Dr. Roberts, 2 areas of concern in the left breast upper

 outer quadrant recommended for stereotactic core biopsy





Assessment and Plan


Assessment: 





Impression:


Radiographic abnormality left breast upper outer quadrant 2 areas


Fibrocystic breast changes


Prior surgery for bile duct cancer, at this time no evidence of disease


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





Plan:


Stereotactic core biopsy 2 areas left breast upper outer quadrant





Risks and benefits of procedure discussed with the patient.  The patient 

understands that secondary to her weight of 100 pounds but we can only use a 

limited amount of local anesthetic, therefore we may only be able to sample one 

of these areas today.


The skull so include but are not limited to bleeding, infection, reaction to the

 anesthetic.  She understands and wishes to proceed.





CC: Dr. Morgan

## 2022-10-11 ENCOUNTER — HOSPITAL ENCOUNTER (OUTPATIENT)
Dept: HOSPITAL 47 - LABWHC1 | Age: 67
Discharge: HOME | End: 2022-10-11
Attending: INTERNAL MEDICINE
Payer: MEDICARE

## 2022-10-11 DIAGNOSIS — I10: Primary | ICD-10-CM

## 2022-10-11 PROCEDURE — 82533 TOTAL CORTISOL: CPT

## 2022-10-11 PROCEDURE — 36415 COLL VENOUS BLD VENIPUNCTURE: CPT

## 2022-10-11 PROCEDURE — 82024 ASSAY OF ACTH: CPT

## 2022-10-14 ENCOUNTER — HOSPITAL ENCOUNTER (OUTPATIENT)
Dept: HOSPITAL 47 - WWCWWP | Age: 67
End: 2022-10-14
Attending: SURGERY
Payer: MEDICARE

## 2022-10-14 VITALS
SYSTOLIC BLOOD PRESSURE: 128 MMHG | DIASTOLIC BLOOD PRESSURE: 75 MMHG | HEART RATE: 76 BPM | TEMPERATURE: 98.1 F | RESPIRATION RATE: 16 BRPM

## 2022-10-14 DIAGNOSIS — N60.19: Primary | ICD-10-CM

## 2022-10-14 NOTE — P.PN
Subjective


Progress Note Date: 10/14/22


Principal diagnosis: 





Fibrocystic breast changes





     Torie is a 67 year old white female status post left breast stero core 

biopsy on 9-29-22 of two sites.  The pathology was benign concordant.  LAD 

revealed fibroadenomatoid hyperplasia with calcifications and background fibro

cystic changes at site 8, and fiber adenomatoid hyperplasia with calcifications 

and background fibrocystic changes including fibrosis and microcalcifications is

site B.


She tolerated the seizure without difficulty.














Objective





- Vital Signs


Vital signs: 


                                   Vital Signs











Temp  98.1 F   10/14/22 11:01


 


Pulse  76   10/14/22 11:01


 


Resp  16   10/14/22 11:01


 


BP  128/75   10/14/22 11:01


 


Pulse Ox  96   10/14/22 11:01


 


FiO2      








                                 Intake & Output











 10/13/22 10/14/22 10/14/22





 18:59 06:59 18:59


 


Weight   45.359 kg














- Constitutional


General appearance: Present: cooperative





- EENT


Eyes: Present: EOMI


ENT: Present: hearing grossly normal





- Neck


Neck: Present: normal ROM





- Respiratory


Respiratory: bilateral: CTA





- Cardiovascular


Heart sounds: normal: S1, S2





- Integumentary


Integumentary Comment(s): 





Small hematoma at biopsy site no ecchymosis or infection


Integumentary: Present: normal turgor





- Musculoskeletal


Musculoskeletal: Present: gait normal





- Psychiatric


Psychiatric: Present: A&O x's 3, appropriate affect, intact judgment & insight





Assessment and Plan


Assessment: 





Impression:


Patient status post her tactic core biopsy 2 sites in the left breast.  Benign 

concordant





Plan: Repeat left breast mammogram in 6 months with physician exam at that time





CC: DR. Morgan

## 2022-12-14 ENCOUNTER — HOSPITAL ENCOUNTER (OUTPATIENT)
Dept: HOSPITAL 47 - LABWHC1 | Age: 67
Discharge: HOME | End: 2022-12-14
Attending: NURSE PRACTITIONER
Payer: MEDICARE

## 2022-12-14 DIAGNOSIS — N39.0: ICD-10-CM

## 2022-12-14 DIAGNOSIS — M10.9: ICD-10-CM

## 2022-12-14 DIAGNOSIS — N18.32: ICD-10-CM

## 2022-12-14 DIAGNOSIS — E55.9: Primary | ICD-10-CM

## 2022-12-14 DIAGNOSIS — D63.1: ICD-10-CM

## 2022-12-14 LAB
HYALINE CASTS UR QL AUTO: 3 /LPF (ref 0–2)
PH UR: 5.5 [PH] (ref 5–8)
RBC UR QL: 4 /HPF (ref 0–5)
SP GR UR: 1.02 (ref 1–1.03)
SQUAMOUS UR QL AUTO: 1 /HPF (ref 0–4)
UROBILINOGEN UR QL STRIP: <2 MG/DL (ref ?–2)
WBC #/AREA URNS HPF: 3 /HPF (ref 0–5)

## 2022-12-14 PROCEDURE — 82570 ASSAY OF URINE CREATININE: CPT

## 2022-12-14 PROCEDURE — 82306 VITAMIN D 25 HYDROXY: CPT

## 2022-12-14 PROCEDURE — 85027 COMPLETE CBC AUTOMATED: CPT

## 2022-12-14 PROCEDURE — 82043 UR ALBUMIN QUANTITATIVE: CPT

## 2022-12-14 PROCEDURE — 83735 ASSAY OF MAGNESIUM: CPT

## 2022-12-14 PROCEDURE — 81001 URINALYSIS AUTO W/SCOPE: CPT

## 2022-12-14 PROCEDURE — 83550 IRON BINDING TEST: CPT

## 2022-12-14 PROCEDURE — 83540 ASSAY OF IRON: CPT

## 2022-12-14 PROCEDURE — 83970 ASSAY OF PARATHORMONE: CPT

## 2022-12-14 PROCEDURE — 84100 ASSAY OF PHOSPHORUS: CPT

## 2022-12-14 PROCEDURE — 80053 COMPREHEN METABOLIC PANEL: CPT

## 2022-12-14 PROCEDURE — 84550 ASSAY OF BLOOD/URIC ACID: CPT

## 2022-12-14 PROCEDURE — 36415 COLL VENOUS BLD VENIPUNCTURE: CPT

## 2022-12-15 LAB
ALBUMIN SERPL-MCNC: 4.1 G/DL (ref 3.8–4.9)
ALBUMIN/GLOB SERPL: 2.21 G/DL (ref 1.6–3.17)
ALP SERPL-CCNC: 111 U/L (ref 41–126)
ALT SERPL-CCNC: 56 U/L (ref 8–44)
ANION GAP SERPL CALC-SCNC: 12.6 MMOL/L (ref 10–18)
AST SERPL-CCNC: 50 U/L (ref 13–35)
BUN SERPL-SCNC: 25.7 MG/DL (ref 9–27)
BUN/CREAT SERPL: 12.79 RATIO (ref 12–20)
CALCIUM SPEC-MCNC: 8.8 MG/DL (ref 8.7–10.3)
CHLORIDE SERPL-SCNC: 104 MMOL/L (ref 96–109)
CO2 SERPL-SCNC: 21.7 MMOL/L (ref 20–27.5)
ERYTHROCYTE [DISTWIDTH] IN BLOOD BY AUTOMATED COUNT: 2.99 X 10*6/UL (ref 4.1–5.2)
ERYTHROCYTE [DISTWIDTH] IN BLOOD: 13.1 % (ref 11.5–14.5)
GLOBULIN SER CALC-MCNC: 1.9 G/DL (ref 1.6–3.3)
GLUCOSE SERPL-MCNC: 92 MG/DL (ref 70–110)
HCT VFR BLD AUTO: 30.5 % (ref 37.2–46.3)
HGB BLD-MCNC: 9.1 G/DL (ref 12–15)
IRON SERPL-MCNC: 71 UG/DL (ref 50–170)
MAGNESIUM SPEC-SCNC: 1.9 MG/DL (ref 1.5–2.4)
MCH RBC QN AUTO: 30.4 PG (ref 27–32)
MCHC RBC AUTO-ENTMCNC: 29.8 G/DL (ref 32–37)
MCV RBC AUTO: 102 FL (ref 80–97)
NRBC BLD AUTO-RTO: 0 /100 WBCS (ref 0–0)
PLATELET # BLD AUTO: 227 X 10*3/UL (ref 140–440)
POTASSIUM SERPL-SCNC: 4.4 MMOL/L (ref 3.5–5.5)
PROT SERPL-MCNC: 5.9 G/DL (ref 6.2–8.2)
SODIUM SERPL-SCNC: 138 MMOL/L (ref 135–145)
TIBC SERPL-MCNC: 255 UG/DL (ref 228–460)
URATE SERPL-MCNC: 6.3 MG/DL (ref 2.9–7.7)
WBC # BLD AUTO: 4.82 X 10*3/UL (ref 4.5–10)

## 2023-01-18 ENCOUNTER — HOSPITAL ENCOUNTER (OUTPATIENT)
Dept: HOSPITAL 47 - PNWHC3 | Age: 68
End: 2023-01-18
Attending: ANESTHESIOLOGY
Payer: MEDICARE

## 2023-01-18 VITALS
TEMPERATURE: 97.8 F | DIASTOLIC BLOOD PRESSURE: 64 MMHG | SYSTOLIC BLOOD PRESSURE: 98 MMHG | HEART RATE: 58 BPM | RESPIRATION RATE: 18 BRPM

## 2023-01-18 DIAGNOSIS — I10: ICD-10-CM

## 2023-01-18 DIAGNOSIS — K21.9: ICD-10-CM

## 2023-01-18 DIAGNOSIS — R10.9: Primary | ICD-10-CM

## 2023-01-18 DIAGNOSIS — M19.90: ICD-10-CM

## 2023-01-18 DIAGNOSIS — Z79.899: ICD-10-CM

## 2023-01-18 PROCEDURE — 99211 OFF/OP EST MAY X REQ PHY/QHP: CPT

## 2023-01-18 NOTE — P.PAINPG
PQRS Measure Charge Sheet


Comment: 





HISTORY OF PRESENT ILLNESS:


67 yr old female as a referral from Dr Resendez presents today w severe and chronic 

LBP x 1 yr secondary to DDD, spondylosis and facet arthropathy without 

myelopathy for evaluation. Pt states pain level is at 8 /10 in intensity, 

constant, localized in the mid to lower lumbar spine, sharp, stabbing in 

character without shooting pain.  Pain is provoked by lifting, bending, 

twisting, doing housework.  Pain is alleviated by medications (Tyl), topicals, 

laying supine, heating pad use, repositioning and rest. No PT/ chiropractic 

treatments / massage therapy due to provoked pain. Pt states she suffered 

extensive weight loss due to renal injury s/p chemotherapy.





PMH: OA, HTN, GERD, Iron Deficiency Anemia


PSH: Tubal Ligation, Whipple Surgery


SH: Hx of tobacco use, No ETOH abuse, No illicit drug use. Has 13 siblings.


FH: Colon CA, Lung CA, Bone CA.


All: NKDA


Meds: See list





REVIEW OF ORGAN SYSTEMS:


                     CONSTITUTIONAL:  No fevers or chills. No recent weight 

loss.


                     NEUROLOGICAL: +  numbness and tingling along the distal 

extremities. No seizure disorders or headaches.


                     MUSCULOSKELETAL: + pain 


                     PSYCHIATRIC:  Denies current depression or suicidal 

thoughts.


    


Physical Examinations  :


                Constitutional : Cooperative , not in acute distress .          

                                                                                

                                                                                

                                                                                

                                                                                

     


                Neurologic :   Cranial nerve II   to  XII  intact. No focal 

neurological deficits.


                Psychiatric : alert & oriented  x 3. Matching mood & appropriate

affect. Judgment & insight intact. 


                Musculoskeletal :     


                               Cervical Spine 


                                         Motor strength in the deltoid and 

biceps: Normal  right side. Normal  Left side


                                         Motor strength biceps and the wrist 

extensors:   Normal right side . Normal left side 


                                         Motor strength in the triceps muscle: 

Normal right side.  Normal  left side  


                                         Deep tendon reflexes:  Normal at the 

biceps. Normal at Brachioradialis. Normal at triceps


                                         Vertebral body tenderness to deep 

palpation over 


                                         Cervical facet loading test: positive 

bilaterally


                                         Spurling test: positive bilaterally


                                         Neck distraction test: positive 

bilaterally


                                         Mckinley sign: positive bilaterally


                                  Lumbar spine


                                         Motor strength lower extremities ,thigh

and legs  5/5 Right side ,  5/5  Left side 


                                         Deep tendon reflexes :   Normal  Knee 

Jerk. Normal   Ankle Jerk  


                                         Vertebral body tenderness over 


                                         Lumbar facet Loading Test: positive 

Right  / positive Left  


                                         Range of motion of the lumbar spine  

Flexion  30 degrees,   extension   10 degrees


                                         Straight Leg Raise test: Left/ Right 

positive at   degree   


                                         Jenna test: positive right /  positive 

left.


                                         Severe tenderness over the Sacroiliac 

joint  on the Right / Left  sides   


                                         Gaenslen  test: positive bilaterally


                                         Seated flexion test: positive 

bilaterally.


                                 Sacral spine :


                                         Severe tenderness over the Sacroiliac 

joint:  right side / left side 


                                         Range of motion: Flexion of the lumbar 

spine <60 degrees


                                         Range of motion: Extension of the 

lumbar spine <20 degrees


                                         Gaenslen's Test positive 


                                         William's Test positive 


                                         Jenna test: positive  right side /  

left side


                                         Thigh Thrust Test


                                         Sacral Thrust Test


Imaging:


PET Scan reviewed





Assessment/ Plan : 


Abdominal Pain secondary to Whipple Surgery due to Pancreatic CA


Recommendation of Celiac Plexus Block. Risks, benefits of procedure discussed 

and patient verbalized understanding. Admits to aspirin or anti- coagulant use 

or medical history of diabetes. Protocol for discontinuation/ continuation of 

medications bobo procedure discussed. Medical clearance sought from Dr Cisneros for 

Melidaquis.


All questions answered.


I have spent greater than 30 minutes on patient care today. Dr Lnyn was 

available by phone for the evaluation of this patient. The time was used to 

review the medical records including relevant urine studies and Prescription 

history (MAPs), review of the available imaging, evaluation and examination of 

the patient, coordination of care with the medical staff and if applicable r

eferring physicians, as well as creation of the medical record


PQRS Narrative: 


                                        





Smoking Status                   Never smoker








Home Medications: 


Ambulatory Orders





Acetaminophen [Tylenol Extra Strength] 1,000 mg PO AS DIRECTED PRN 02/01/22 


Apixaban [Eliquis] 5 mg PO BID 02/01/22 


Calcium Carbonate [Calcium] 600 mg PO DAILY 02/01/22 


Cyanocobalamin [Vitamin B-12] 1,000 mcg PO DAILY 02/01/22 


Diphenox-Atrop 2.5-0.025 mg [Lomotil] 1 tab PO AS DIRECTED PRN 02/01/22 


Iron 28 mg PO DAILY 02/01/22 


L.acidoph,Paracasei, B.lactis [Probiotic] 1 each PO DAILY 02/01/22 


Lipase/Protease/Amylase [Creon Dr 24,000 Unit Capsule] 2 capsule PO BID-W/MEALS 

02/01/22 


Loperamide [Imodium] 2 mg PO AS DIRECTED PRN 02/01/22 


Magnesium Oxide [Mag-Ox] 400 mg PO TID 02/01/22 


Omeprazole 20 mg PO DAILY 02/01/22 


Calcium Acetate 667 mg PO DAILY 09/09/22 


Folic Acid 1 mg PO DAILY 09/09/22 


Gabapentin [Neurontin] 100 mg PO TID 09/09/22 


Metoprolol Succinate (ER) [Toprol Xl] 50 mg PO DAILY 09/09/22 


Sodium Bicarbonate 650 mg PO BID 09/09/22 


calcitrioL [Calcitriol] 0.5 mcg PO WEEKLY 09/09/22 


hydrALAZINE HCL [Apresoline] 50 mg PO TID 09/09/22 











Controlled Substance Measures





- Controlled Substance Measures


Is patient prescribed a controlled substance at discharge?: No

## 2023-03-15 ENCOUNTER — HOSPITAL ENCOUNTER (OUTPATIENT)
Dept: HOSPITAL 47 - LABWHC1 | Age: 68
Discharge: HOME | End: 2023-03-15
Attending: INTERNAL MEDICINE
Payer: MEDICARE

## 2023-03-15 DIAGNOSIS — N18.32: ICD-10-CM

## 2023-03-15 DIAGNOSIS — N39.0: ICD-10-CM

## 2023-03-15 DIAGNOSIS — D63.1: ICD-10-CM

## 2023-03-15 DIAGNOSIS — E55.9: Primary | ICD-10-CM

## 2023-03-15 DIAGNOSIS — N25.81: ICD-10-CM

## 2023-03-15 DIAGNOSIS — M10.9: ICD-10-CM

## 2023-03-15 LAB
ALBUMIN SERPL-MCNC: 3.5 G/DL (ref 3.8–4.9)
ALBUMIN/GLOB SERPL: 1.94 G/DL (ref 1.6–3.17)
ALP SERPL-CCNC: 157 U/L (ref 41–126)
ALT SERPL-CCNC: 26 U/L (ref 8–44)
ANION GAP SERPL CALC-SCNC: 12.6 MMOL/L (ref 10–18)
AST SERPL-CCNC: 32 U/L (ref 13–35)
BASOPHILS # BLD AUTO: 0.06 X 10*3/UL (ref 0–0.1)
BASOPHILS NFR BLD AUTO: 1.3 %
BUN SERPL-SCNC: 26.8 MG/DL (ref 9–27)
BUN/CREAT SERPL: 15.76 RATIO (ref 12–20)
CALCIUM SPEC-MCNC: 9 MG/DL (ref 8.7–10.3)
CHLORIDE SERPL-SCNC: 98 MMOL/L (ref 96–109)
CO2 SERPL-SCNC: 23.4 MMOL/L (ref 20–27.5)
EOSINOPHIL # BLD AUTO: 0.08 X 10*3/UL (ref 0.04–0.35)
EOSINOPHIL NFR BLD AUTO: 1.7 %
ERYTHROCYTE [DISTWIDTH] IN BLOOD BY AUTOMATED COUNT: 2.93 X 10*6/UL (ref 4.1–5.2)
ERYTHROCYTE [DISTWIDTH] IN BLOOD: 15.1 % (ref 11.5–14.5)
GLOBULIN SER CALC-MCNC: 1.8 G/DL (ref 1.6–3.3)
GLUCOSE SERPL-MCNC: 121 MG/DL (ref 70–110)
HCT VFR BLD AUTO: 29.2 % (ref 37.2–46.3)
HGB BLD-MCNC: 9.3 G/DL (ref 12–15)
IMM GRANULOCYTES BLD QL AUTO: 0.9 %
IRON SERPL-MCNC: 120 UG/DL (ref 50–170)
LYMPHOCYTES # SPEC AUTO: 0.68 X 10*3/UL (ref 0.9–5)
LYMPHOCYTES NFR SPEC AUTO: 14.6 %
MAGNESIUM SPEC-SCNC: 2 MG/DL (ref 1.5–2.4)
MCH RBC QN AUTO: 31.7 PG (ref 27–32)
MCHC RBC AUTO-ENTMCNC: 31.8 G/DL (ref 32–37)
MCV RBC AUTO: 99.7 FL (ref 80–97)
MONOCYTES # BLD AUTO: 0.17 X 10*3/UL (ref 0.2–1)
MONOCYTES NFR BLD AUTO: 3.7 %
NEUTROPHILS # BLD AUTO: 3.62 X 10*3/UL (ref 1.8–7.7)
NEUTROPHILS NFR BLD AUTO: 77.8 %
NRBC BLD AUTO-RTO: 0 /100 WBCS (ref 0–0)
PLATELET # BLD AUTO: 181 X 10*3/UL (ref 140–440)
POTASSIUM SERPL-SCNC: 4.1 MMOL/L (ref 3.5–5.5)
PROT SERPL-MCNC: 5.3 G/DL (ref 6.2–8.2)
SODIUM SERPL-SCNC: 134 MMOL/L (ref 135–145)
TIBC SERPL-MCNC: 209 UG/DL (ref 228–460)
URATE SERPL-MCNC: 6.7 MG/DL (ref 2.9–7.7)
WBC # BLD AUTO: 4.65 X 10*3/UL (ref 4.5–10)

## 2023-03-15 PROCEDURE — 83550 IRON BINDING TEST: CPT

## 2023-03-15 PROCEDURE — 84550 ASSAY OF BLOOD/URIC ACID: CPT

## 2023-03-15 PROCEDURE — 81001 URINALYSIS AUTO W/SCOPE: CPT

## 2023-03-15 PROCEDURE — 80053 COMPREHEN METABOLIC PANEL: CPT

## 2023-03-15 PROCEDURE — 82043 UR ALBUMIN QUANTITATIVE: CPT

## 2023-03-15 PROCEDURE — 82306 VITAMIN D 25 HYDROXY: CPT

## 2023-03-15 PROCEDURE — 83970 ASSAY OF PARATHORMONE: CPT

## 2023-03-15 PROCEDURE — 83735 ASSAY OF MAGNESIUM: CPT

## 2023-03-15 PROCEDURE — 36415 COLL VENOUS BLD VENIPUNCTURE: CPT

## 2023-03-15 PROCEDURE — 83540 ASSAY OF IRON: CPT

## 2023-03-15 PROCEDURE — 84100 ASSAY OF PHOSPHORUS: CPT

## 2023-03-15 PROCEDURE — 85025 COMPLETE CBC W/AUTO DIFF WBC: CPT

## 2023-03-15 PROCEDURE — 82570 ASSAY OF URINE CREATININE: CPT

## 2023-03-16 LAB
PH UR: 5 [PH] (ref 5–8)
SP GR UR: 1.02 (ref 1–1.03)
UROBILINOGEN UR QL: 0.2

## 2023-03-31 ENCOUNTER — HOSPITAL ENCOUNTER (OUTPATIENT)
Dept: HOSPITAL 47 - LABWHC1 | Age: 68
Discharge: HOME | End: 2023-03-31
Attending: NURSE PRACTITIONER
Payer: MEDICARE

## 2023-03-31 DIAGNOSIS — N18.32: Primary | ICD-10-CM

## 2023-03-31 LAB
ALBUMIN SERPL-MCNC: 3.2 G/DL (ref 3.8–4.9)
ALBUMIN/GLOB SERPL: 2.08 G/DL (ref 1.6–3.17)
ALP SERPL-CCNC: 129 U/L (ref 41–126)
ALT SERPL-CCNC: 20 U/L (ref 8–44)
ANION GAP SERPL CALC-SCNC: 12.4 MMOL/L (ref 10–18)
AST SERPL-CCNC: 26 U/L (ref 13–35)
BUN SERPL-SCNC: 15.3 MG/DL (ref 9–27)
BUN/CREAT SERPL: 9.94 RATIO (ref 12–20)
CALCIUM SPEC-MCNC: 8.6 MG/DL (ref 8.7–10.3)
CHLORIDE SERPL-SCNC: 105 MMOL/L (ref 96–109)
CO2 SERPL-SCNC: 19.2 MMOL/L (ref 20–27.5)
GLOBULIN SER CALC-MCNC: 1.6 G/DL (ref 1.6–3.3)
GLUCOSE SERPL-MCNC: 123 MG/DL (ref 70–110)
POTASSIUM SERPL-SCNC: 4.4 MMOL/L (ref 3.5–5.5)
PROT SERPL-MCNC: 4.8 G/DL (ref 6.2–8.2)
SODIUM SERPL-SCNC: 136 MMOL/L (ref 135–145)

## 2023-03-31 PROCEDURE — 80053 COMPREHEN METABOLIC PANEL: CPT

## 2023-03-31 PROCEDURE — 36415 COLL VENOUS BLD VENIPUNCTURE: CPT

## 2023-04-03 ENCOUNTER — HOSPITAL ENCOUNTER (OUTPATIENT)
Dept: HOSPITAL 47 - PNWHC3 | Age: 68
End: 2023-04-03
Attending: ANESTHESIOLOGY
Payer: MEDICARE

## 2023-04-03 ENCOUNTER — HOSPITAL ENCOUNTER (OUTPATIENT)
Dept: HOSPITAL 47 - RADXRMAIN | Age: 68
Discharge: HOME | End: 2023-04-03
Attending: ANESTHESIOLOGY
Payer: MEDICARE

## 2023-04-03 ENCOUNTER — HOSPITAL ENCOUNTER (OUTPATIENT)
Dept: HOSPITAL 47 - RADMAMWWP | Age: 68
Discharge: HOME | End: 2023-04-03
Attending: SURGERY
Payer: MEDICARE

## 2023-04-03 VITALS
DIASTOLIC BLOOD PRESSURE: 86 MMHG | RESPIRATION RATE: 18 BRPM | SYSTOLIC BLOOD PRESSURE: 154 MMHG | TEMPERATURE: 98 F | HEART RATE: 72 BPM

## 2023-04-03 DIAGNOSIS — C25.9: ICD-10-CM

## 2023-04-03 DIAGNOSIS — M47.816: Primary | ICD-10-CM

## 2023-04-03 DIAGNOSIS — R92.8: Primary | ICD-10-CM

## 2023-04-03 DIAGNOSIS — G89.3: ICD-10-CM

## 2023-04-03 DIAGNOSIS — M51.37: ICD-10-CM

## 2023-04-03 DIAGNOSIS — Z78.0: ICD-10-CM

## 2023-04-03 DIAGNOSIS — R10.9: ICD-10-CM

## 2023-04-03 DIAGNOSIS — M51.36: Primary | ICD-10-CM

## 2023-04-03 PROCEDURE — 77065 DX MAMMO INCL CAD UNI: CPT

## 2023-04-03 PROCEDURE — 72100 X-RAY EXAM L-S SPINE 2/3 VWS: CPT

## 2023-04-03 PROCEDURE — 77061 BREAST TOMOSYNTHESIS UNI: CPT

## 2023-04-03 PROCEDURE — 99211 OFF/OP EST MAY X REQ PHY/QHP: CPT

## 2023-04-03 NOTE — P.PN
Subjective


Progress Note Date: 04/03/23


This is follow-up visit for this 67 years old female with a history of severe 

chronic abdominal pain, she was diagnosed with pancreatic cancer, previously we 

have done celiac plexus block and she reports she gets excellent pain relief of 

her abdominal pain, currently she is complaining of increased low back pain 

which she had before, patient reported that she had a previous history of 

chronic back pain, but currently reported that her back pain increases in 

intensity which is interfering with her quality of life, is constant knots 

radiated to the lower extremity, she denies any motor or sensory deficit in the 

lower extremity.





Objective





- Vital Signs


Vital signs: 


                                 Intake & Output











 04/02/23 04/03/23 04/03/23





 18:59 06:59 18:59


 


Weight   41.73 kg














- Exam


    


   Physical Examinations  :


    -Constitutiona       : Cooperative , not in acute distress .


    -HEENT                :  nech :  supple ,  no Lymphadenopathy  , normal  

thyroid  size .


                               :   eyes  :  no ptosis , no icterus,  no 

photophobia .                                                                   

                                                                                

                                                                                

                                                                                

                                                               


    - neurologic         :   Cranial nerve II   to  XII  intact ,  no   focal 

neurological deffecit  .


    -psychatric          : alert ,  oriented  X 3  ,   appropriate affect   , 

intact judgment  and insight  .  


    -Lymphatic          :    no Lymphadenopathy .


   - musculoskeltal   :     


                                   Lumber spine


                                         moter stegnth lower extremities ,thigh 

and legs  5/5 Right side ,  5/5  Left side 


                                         deep tendon reflexes :   normal  Knee 

Jerk    , normal   ankle Jerk  


                                         lumber facet Loading Test =positive 

Right , positive Left 


                                         Range of motion of the lumbar spine  

Flexion  30 degrees,   extension   10 degrees


                                         strait leg raising test = negative 

bilaterally   


                                          Fabere test= negative bilaterally


                                          tenderness over the  Sacroiliac joint 

on the Right , and Left  sides   


                                 





Assessment and Plan


Plan: 


Assessment and plan=1-chronic abdominal pain secondary to pancreatic cancer pain

improved after celiac plexus block.


                               2-chronic low back pain secondary to lumbar 

spondylosis with lumbar facet arthropathy.


                               I will  order MRI of the lumbar spine to evaluate

the etiology patient will follow up in the pain clinic in 2 weeks


Time with Patient: Less than 30

## 2023-04-03 NOTE — XR
EXAM TYPE: LUMBAR SPINE X RAY SERIES

 

COMPARISON: NONE

 

HISTORY: Pain

 

TECHNIQUE: 4 views are submitted.

 

FINDINGS:

Alignment is anatomic.  The pedicles are intact.  The transverse processes are intact.  There is diff
use osteopenia with multilevel degenerative disc disease most marked at L5-S1 with multilevel facet a
rthropathy. Grade 1 anterolisthesis of L4 on L5. Vascular calcifications are noted. Surgical clips in
 the abdomen.

 

IMPRESSION:

1. Diffuse osteopenia and multilevel degenerative disc disease most marked at L5-S1. Multilevel nando
inal encroachment results in suspected foraminal encroachment at multiple levels.

## 2023-04-13 ENCOUNTER — HOSPITAL ENCOUNTER (OUTPATIENT)
Dept: HOSPITAL 47 - WWCWWP | Age: 68
End: 2023-04-13
Attending: SURGERY
Payer: MEDICARE

## 2023-04-13 VITALS
DIASTOLIC BLOOD PRESSURE: 77 MMHG | HEART RATE: 65 BPM | RESPIRATION RATE: 16 BRPM | TEMPERATURE: 97.8 F | SYSTOLIC BLOOD PRESSURE: 137 MMHG

## 2023-04-13 DIAGNOSIS — Z87.891: ICD-10-CM

## 2023-04-13 DIAGNOSIS — K21.9: ICD-10-CM

## 2023-04-13 DIAGNOSIS — I10: ICD-10-CM

## 2023-04-13 DIAGNOSIS — N60.12: ICD-10-CM

## 2023-04-13 DIAGNOSIS — N60.11: Primary | ICD-10-CM

## 2023-04-13 DIAGNOSIS — Z80.0: ICD-10-CM

## 2023-04-13 DIAGNOSIS — C25.9: ICD-10-CM

## 2023-04-13 DIAGNOSIS — Z90.49: ICD-10-CM

## 2023-04-13 DIAGNOSIS — Z86.718: ICD-10-CM

## 2023-04-13 DIAGNOSIS — Z85.07: ICD-10-CM

## 2023-04-13 DIAGNOSIS — Z79.01: ICD-10-CM

## 2023-04-13 NOTE — P.PN
Subjective


Progress Note Date: 04/13/23


Principal diagnosis: 





fibrocystic breast changes


  Torie is a 67-year-old white female seen in consultation for Dr. Morgan 

regarding the mammographic abnormality in the left breast.  She had a routine 

screening [place] [address] after which additional views of the 

left breast were recommended.  These were performed on 8/3022.  

Microcalcifications of concern were noted in the left breast in the upper outer 

quadrant.  2 clips were identified as reviewed by Dr. Roberts and recommended 

for stereotactic core biopsy.  She does not feel any lumps masses or nodules of 

concern in either breast.  This was a routine screening mammogram.  She has not 

had any prior surgery on her breasts.  She has not had any trauma or infection 

in her breast.





The patient was diagnosed with pancreatic cancer in 2021, she had a whipple 

procedure at [place], she had chemotherapy. She did not have radiation therapy.

She was told she was tumor free. It started in the bile duct. 








4-13-23





She underwent a stero core biopsy of the left breast of two sites on 9-29-22 

which were both benign concordant.  She had a recent repeat left breast 

mammogram on 4-3-23 which was BIRADS 3. This was personally reviewed.  She was 

recommended to undergo a repeat bilateral mammogram in September 2023. She is 

not complaining of any changes in her breast. She is on treatment for the 

pancreatic cancer, she is on chemotherapy.





Caffeine: 1 cup coffee/day


nicotine: stopped June 2020, used to smoke 1PPD for > 20 years; diagnosed with 

pancreatic cancer


chocolate: weekly


BCP: 2 years in her 20's





Family History: 13 siblings


brother: colon cancer


sister: colon cancer


brother: bone cancer


mother: stomach cancer


atient: pancreatic cancer





patient had genetic testing and told (-)





Hormonal History:


menarche: 14


T3OI1M5,   breast fed: no, age at first birth: 19


menopause: 49


hormones: none





Surgeries: 


tubal


Whipple








Medical History:


Last 2 PET scans a spot on L3L4


follows with Dr. Cisneros


kidney disease








SociaL History:


nicotine: stopped 2020


alcohol: none


drugs: none





























- Constitutional


Constitutional: Denies chills, Denies fever





- EENT


Eyes: denies blurred vision, denies pain


Ears: deny: decreased hearing, tinnitus


Ears, nose, mouth and throat: Denies headache, Denies sore throat





- Breasts


Breasts: bilateral: as per HPI





- Cardiovascular


Cardiovascular: Denies chest pain, Denies shortness of breath





- Respiratory


Comment: 





former smoker





- Gastrointestinal


Comment: 





bile duct cancer resected


Gastrointestinal: Reports as per HPI





- Genitourinary (Female)


Genitourinary: Denies dysuria, Denies hematuria





- Menstruation


Menstruation: Reports postmenopausal





- Musculoskeletal


Musculoskeletal: Denies myalgias





- Integumentary


Integumentary: Denies pruritus, Denies rash





- Neurological


Comment: 





neuropathy from chemotherapy


Neurological: Reports numbness, Denies weakness





- Psychiatric


Psychiatric: Denies anxiety, Denies depression





- Endocrine


Endocrine: Denies fatigue, Denies weight change





- Hematologic/Lymphatic


Comment: 





 eliquis from a superficial blood clot in the left groin, stopped on Tuesday





- Allergic/Immunologic


Allergic/Immunologic: Reports as per HPI





Past Medical History


Past Medical History: Cancer, GERD/Reflux, Hypertension


Additional Past Medical History / Comment(s): PANCREATIC CANCER DIAGNOSED 

2/2020, LAST CHEMO OCT /2021., INFUSA-PORT RIGHT CHEST, DVT LEFT LEG (DIAGNOSED 

JAN 2022)., HX OF DIVERTICULITIS., HX OF BLOOD TRANSFUSIONS (LAST OCT 2021)., 

DIARRHEA.


History of Any Multi-Drug Resistant Organisms: None Reported


Past Surgical History: Tubal Ligation


Additional Past Surgical History / Comment(s): vein stripping., biliary duct 

stent ., INFUSA-PORT RIGHT CHEST.,.  WHIPPLE PROCEDURE(March 4, 2021)


Past Anesthesia/Blood Transfusion Reactions: No Reported Reaction


Past Psychological History: No Psychological Hx Reported


Smoking Status: Former smoker


Past Alcohol Use History: None Reported


Additional Past Alcohol Use History / Comment(s): quit smoking June 2020, hx of 

2 ppd.


Past Drug Use History: None Reported





- Past Family History


  ** Brother(s)


Family Medical History: Cancer


Additional Family Medical History / Comment(s): colon cancer





  ** Sister(s)


Family Medical History: Cancer


Additional Family Medical History / Comment(s): colon cancer





  ** Mother


Family Medical History: Cancer


Additional Family Medical History / Comment(s): stomach cancer





Medications and Allergies


                                Home Medications











 Medication  Instructions  Recorded  Confirmed  Type


 


Acetaminophen [Tylenol Extra 1,000 mg PO AS DIRECTED PRN 02/01/22 09/29/22 

History





Strength]    


 


Apixaban [Eliquis] 5 mg PO BID 02/01/22 09/29/22 History


 


Calcium Carbonate [Calcium] 600 mg PO DAILY 02/01/22 09/29/22 History


 


Cyanocobalamin [Vitamin B-12] 1,000 mcg PO DAILY 02/01/22 09/29/22 History


 


Diphenox-Atrop 2.5-0.025 mg 1 tab PO AS DIRECTED PRN 02/01/22 09/29/22 History





[Lomotil]    


 


Iron 28 mg PO DAILY 02/01/22 09/29/22 History


 


L.acidoph,Paracasei, B.lactis 1 each PO DAILY 02/01/22 09/29/22 History





[Probiotic]    


 


Lipase/Protease/Amylase [Creon Dr 2 capsule PO BID-W/MEALS 02/01/22 09/29/22 

History





24,000 Unit Capsule]    


 


Loperamide [Imodium] 2 mg PO AS DIRECTED PRN 02/01/22 09/29/22 History


 


Magnesium Oxide [Mag-Ox] 400 mg PO TID 02/01/22 09/29/22 History


 


Omeprazole 20 mg PO DAILY 02/01/22 09/29/22 History


 


Calcium Acetate 667 mg PO DAILY 09/09/22 09/29/22 History


 


Folic Acid 1 mg PO DAILY 09/09/22 09/29/22 History


 


Gabapentin [Neurontin] 100 mg PO TID 09/09/22 09/29/22 History


 


Metoprolol Succinate (ER) [Toprol 50 mg PO DAILY 09/09/22 09/29/22 History





Xl]    


 


Sodium Bicarbonate 650 mg PO BID 09/09/22 09/29/22 History


 


calcitrioL [Calcitriol] 0.5 mcg PO WEEKLY 09/09/22 09/29/22 History


 


hydrALAZINE HCL [Apresoline] 50 mg PO TID 09/09/22 09/29/22 History








                                    Allergies











Allergy/AdvReac Type Severity Reaction Status Date / Time


 


No Known Allergies Allergy   Verified 09/29/22 07:35




















Objective





- Constitutional


General appearance: Present: cooperative





- EENT


Eyes: Present: EOMI


ENT: Present: hearing grossly normal





- Neck


Neck: Present: normal ROM





- Respiratory


Respiratory: bilateral: CTA





- Cardiovascular


Rhythm: regular


Heart sounds: normal: S1, S2





- Gastrointestinal


Gastrointestinal Comment(s): 





ventral hernia


General gastrointestinal: Present: soft





- Integumentary


Integumentary: Present: normal turgor





- Musculoskeletal


Musculoskeletal: Present: gait normal





- Psychiatric


Psychiatric: Present: A&O x's 3, appropriate affect, intact judgment & insight





- Additional findings


Additional findings: 


Breast Exam:


BRA: medium sports bra


inspection: bilateral grade 2 ptosis


Palpation:


Right breast: Multi positional exam fibrocystic changes no dominant masses or 

nodules of concern


Right axilla: No adenopathy of concern


Left breast: Multi-positional exam fibrocystic changes no dominant masses or 

nodules of concern


Left axilla: No adenopathy of concern











Assessment and Plan


Assessment: 





Impression:


recurrent pancreatic cancer on chemotherapy


fibrocystic breast changes


mammogram on 4-3-23 BIRAD 3





Plan:


Repeat bilateral mammograms September 2023


Continue treatment pancreatic cancer


Follow-up.  Questions or concerns


At this time there is nothing on examination of her breast which would warrant 

biopsy of the breast.





CC: Dr. Morgan

## 2023-05-27 ENCOUNTER — HOSPITAL ENCOUNTER (OUTPATIENT)
Dept: HOSPITAL 47 - RADMRIMAIN | Age: 68
Discharge: HOME | End: 2023-05-27
Attending: ANESTHESIOLOGY
Payer: MEDICARE

## 2023-05-27 DIAGNOSIS — M99.72: ICD-10-CM

## 2023-05-27 DIAGNOSIS — M99.73: ICD-10-CM

## 2023-05-27 DIAGNOSIS — M51.37: Primary | ICD-10-CM

## 2023-05-27 DIAGNOSIS — M47.816: ICD-10-CM

## 2023-05-27 DIAGNOSIS — M43.16: ICD-10-CM

## 2023-05-27 PROCEDURE — 72148 MRI LUMBAR SPINE W/O DYE: CPT

## 2023-05-27 NOTE — MR
EXAMINATION TYPE: MR lumbar spine wo con

 

DATE OF EXAM: 5/27/2023 3:49 PM

 

COMPARISON: Radiograph 4/3/2023, 11/4/2020

 

CLINICAL INDICATION:Female, 68 years old with history of Low back pain

 

TECHNIQUE:  Multi planar, multi sequence imaging was performed utilizing: T1-weighted, T2-weighted, a
nd turbo inversion recovery imaging of the lumbar spine. 

IV Contrast: None.

 

FINDINGS:  

Alignment: The lumbar vertebral bodies have preserved heights and grade 1 anterolisthesis of L4 and L
5..  

 

Cord: The conus medullaris and the distal spinal cord appear unremarkable with regards to their signa
l intensity and morphology. 

 

Bones/Discs: Multilevel degeneration changes of the spine. There is diffuse low T1 signal which is he
terogenous throughout the spine. No abnormal bony edema on inversion recovery sequences. Osteophyte f
ormation worse in the lower lumbar spine and thoracic spine mild disc desiccation present.

 

T12-L1: No evidence of significant spinal canal stenosis or neural foraminal stenosis. 

 

L1-L2: No evidence of significant spinal canal stenosis or neural foraminal stenosis. 

 

L2-L3: Disc bulge and facet joint arthropathy result in mild spinal canal and mild to moderate bilate
ral neural foraminal stenosis. 

 

L3-L4: Disc bulge and facet joint arthropathy result in mild to moderate spinal canal and moderate to
 severe bilateral neural foraminal stenosis. 

 

L4-L5: Disc bulge and facet joint arthropathy result in moderate spinal canal and moderate to severe 
bilateral neural foraminal stenosis. 

 

L5-S1: The disc is rounded posterior morphology with moderate spinal canal stenosis. Facet joint arth
ropathy with severe right and moderate to severe left neural foraminal stenosis. 

 

T12-L1 at least mild to moderate spinal canal stenosis. T11-T12 at least mild spinal canal stenosis s
econdary to facet joint arthropathy with moderate to severe bilateral neural foraminal stenosis.

 

Other findings:  None.

 

IMPRESSION:

1.  No definitive evidence of disc herniation or significant spinal canal stenosis.

 

2.  Moderate to severe disc degeneration with associated osteoarthritic changes worse at L5-S1 with s
evere right and moderate severe left neural foraminal stenosis and moderate spinal canal stenosis. Th
ere is also moderate spinal canal stenosis L4-L5.

 

3. Heterogenous and low signal on T1-weighted imaging appearance of the bone marrow, correlate for re
d marrow reconversion.

 

4. Grade 1 anterolisthesis of. L4 on L5/

 

5. Partially evaluated T11-T12 and T12-L1 levels on sagittal only suggest at least mild to moderate s
marilee canal stenosis secondary to facet joint arthropathy. Additional T11-T12 moderate to severe neur
al foraminal stenosis bilaterally.

## 2023-06-21 ENCOUNTER — HOSPITAL ENCOUNTER (OUTPATIENT)
Dept: HOSPITAL 47 - PNWHC3 | Age: 68
End: 2023-06-21
Attending: ANESTHESIOLOGY
Payer: MEDICARE

## 2023-06-21 VITALS
DIASTOLIC BLOOD PRESSURE: 81 MMHG | HEART RATE: 70 BPM | TEMPERATURE: 98.6 F | RESPIRATION RATE: 18 BRPM | SYSTOLIC BLOOD PRESSURE: 138 MMHG

## 2023-06-21 DIAGNOSIS — G89.29: ICD-10-CM

## 2023-06-21 DIAGNOSIS — M51.36: Primary | ICD-10-CM

## 2023-06-21 DIAGNOSIS — M47.816: ICD-10-CM

## 2023-06-21 PROCEDURE — 99211 OFF/OP EST MAY X REQ PHY/QHP: CPT

## 2023-06-21 NOTE — P.PAINPG
PQRS Measure Charge Sheet


Comment: 





A  68  yr old female with a history of severe and chronic LBP secondary to 

lumbar DDD and spondylosis with facet arthropathy without myelopathy presents 

today for MRI lumbar spine results. Pain level is provoked at 9 /10 in 

intensity, constant, localized in the lumbar spine, achy in character without 

shooting pain. Pain is provoked by upright positions for extended periods of 

time. Pain is alleviated by medications (Tyl), topicals, laying supine, heating 

pad use, repositioning and rest. No PT/ chiropractic treatments / massage 

therapy due to provoked pain. Pt states she suffered extensive weight loss due 

to renal injury s/p chemotherapy.





Interventional pain procedures completed include Celiac Plexus Block


Patient is currently on Tyl


Patient denies any side effects of the medication(s), denies excessive 

drowsiness or sleepiness, denies suicidal ideation and reports that the current 

pain medication is  helping to control the  pain and improve activities of daily

living.


Patient denies any motor or sensory deficits. Patient denies any fever or night 

sweats, denies any change in the bowel movements or urination.


 


Physical Examination:


  -Constitutional: Cooperative. Not in acute distress .                         

                                                                                

                                                                                

  


 - Neurologic:  Cranial nerve II to  XII  intact. No focal neurological 

deficits.


 - Psychatric: Alert & oriented x 3. Matching mood & appropriate affect. 

Judgment and insight intact.  


 - Musculoskeletal:     


Cervical spine: 


      Muscle bulk/ tone/ strength in the bilateral upper extremities normal


      Vertebral body tenderness to palpation over 


      Spurling test positive 


      Distraction test positive 


      Facet loading test positive TTP 





Thoracic spine   


     Muscle bulk / tone/ strength in the bilateral paraspinal muscles normal


     Vertebral body tender to palpation over L3


      Rollins Test positive


     Facet loading test positive TTP 


                                                                                

                                                                                

                                                                                

                                                                                

                                                                                

    


Lumbar spine: 


     Motor bulk/ tone/ strength  lower extremities , thigh and legs : 5/5 


     Deep tendon reflexes :   Normal  Knee Jerk. Normal Ankle Jerk  .


     Vertebral body tenderness to palpation over 


     Rollins Test positive


     Lumbar Facet Loading Test  positive 


     Straight Leg Raise: positive at  30  degrees right side/ left side 


     Gaenslen's Test positive  





Sacral spine :


     Severe tenderness over the Sacroiliac joint:  right side / left side 


     Range of motion: Flexion of the lumbar spine <60 degrees


     Range of motion: Extension of the lumbar spine <20 degrees


     Gaenslen's Test positive right side / left side


     Jenna test: positive  right side /  left side


     Thigh Thrust Test positive right side / left side


     Sacral Thrust Test positive right side / left side





Imaging:


MRI non contrast of the lumbar spine from 5/27/23 reviewed





Assessment and plan:


       Chronic LBP secondary to lumbar DDD, spondylosis with facet arthropathy 

without myelopathy


       Recommendation of MECHE L2-L3 #1. May need a series of injections for 

optimal pain relief. Risks, benefits of procedure discussed and pt verbalized 

understanding. Admits to anticoagulant use or medical history of diabetes. 

Protocol for discontinuation/ continuation of medications bobo procedure 

discussed. Minimal anesthesia provided, if clinically indicated, consisting of 

Versed and Fentanyl. All questions answered.





I have spent less than 30 minutes on patient care today. Dr Lynn was 

available by phone for the evaluation of this patient. The time was used to 

review the medical records including relevant urine studies and Prescription 

history (MAPs), review of the available imaging, evaluation and examination of 

the patient, coordination of care with the medical staff and if applicable 

referring physicians, as well as creation of the medical record


  


PQRS Narrative: 


                                        





Smoking Status                   Never smoker








Home Medications: 


Ambulatory Orders





Acetaminophen [Tylenol Extra Strength] 1,000 mg PO AS DIRECTED PRN 02/01/22 


Apixaban [Eliquis] 5 mg PO BID 02/01/22 


Calcium Carbonate [Calcium] 600 mg PO DAILY 02/01/22 


Cyanocobalamin [Vitamin B-12] 1,000 mcg PO DAILY 02/01/22 


Diphenox-Atrop 2.5-0.025 mg [Lomotil] 1 tab PO AS DIRECTED PRN 02/01/22 


Iron 28 mg PO DAILY 02/01/22 


L.acidoph,Paracasei, B.lactis [Probiotic] 1 each PO DAILY 02/01/22 


Lipase/Protease/Amylase [Creon Dr 24,000 Unit Capsule] 2 capsule PO TID-W/MEALS 

02/01/22 


Loperamide [Imodium] 2 mg PO AS DIRECTED PRN 02/01/22 


Magnesium Oxide [Mag-Ox] 400 mg PO DAILY 02/01/22 


Omeprazole 20 mg PO DAILY 02/01/22 


Calcium Acetate 667 mg PO DAILY 09/09/22 


Folic Acid 1 mg PO DAILY 09/09/22 


Gabapentin [Neurontin] 100 mg PO TID 09/09/22 


Metoprolol Succinate (ER) [Toprol Xl] 25 mg PO DAILY 09/09/22 


Sodium Bicarbonate 650 mg PO BID 09/09/22 


calcitrioL [Calcitriol] 0.5 mcg PO WEEKLY 09/09/22 











Controlled Substance Measures





- Controlled Substance Measures


Is patient prescribed a controlled substance at discharge?: No

## 2023-07-12 NOTE — US
EXAMINATION TYPE: US venous doppler duplex LE LT

 

DATE OF EXAM: 1/3/2022 4:03 PM

 

COMPARISON: NONE

 

CLINICAL HISTORY: 66-year-old female M79.662,R22.42 PAIN AND SWELLING IN LT LOWER LIMB. Patient state
s having left lower extremity swelling at the ankle.

 

SIDE PERFORMED: Left  

 

TECHNIQUE:  The lower extremity deep venous system is examined utilizing real time linear array sonog
michele with graded compression, doppler sonography and color-flow sonography.

 

FINDINGS:

 

VESSELS IMAGED:

Common Femoral Vein

Deep Femoral Vein

Greater Saphenous Vein *

Femoral Vein

Popliteal Vein

Small Saphenous Vein *

Proximal Calf Veins

(* superficial vessels)

 

 

 

Left Leg:  There appears to be superficial thrombus formation in the greater saphenous vein. 

 

 

 

IMPRESSION:

1. No evidence for DVT within the left lower extremity imaged from the groin to the upper calf.

2. However, the exam is positive for SVT involving the greater saphenous vein.
None

## 2023-08-31 ENCOUNTER — HOSPITAL ENCOUNTER (OUTPATIENT)
Dept: HOSPITAL 47 - PNWHC3 | Age: 68
End: 2023-08-31
Attending: ANESTHESIOLOGY
Payer: MEDICARE

## 2023-08-31 VITALS
HEART RATE: 61 BPM | TEMPERATURE: 97.5 F | DIASTOLIC BLOOD PRESSURE: 74 MMHG | SYSTOLIC BLOOD PRESSURE: 120 MMHG | RESPIRATION RATE: 16 BRPM

## 2023-08-31 DIAGNOSIS — M54.50: Primary | ICD-10-CM

## 2023-08-31 PROCEDURE — 99211 OFF/OP EST MAY X REQ PHY/QHP: CPT

## 2023-08-31 NOTE — P.PAINPG
PQRS Measure Charge Sheet


Comment: 





A  68  yr old female with a history of severe and chronic LBP secondary to 

lumbar DDD and spondylosis with facet arthropathy without myelopathy presents 

today for MECHE L2-L3 #1. Pt states she experienced 90 % pain relief x 3 wks s/p 

procedure. Pain level is provoked at 8 /10 in intensity, constant, localized in 

the lumbar spine, achy in character without shooting pain. Pain is provoked by 

upright positions for extended periods of time. Pain is alleviated by 

medications, topicals, laying supine, heating pad use, repositioning and rest. 

No PT/ chiropractic treatments / massage therapy due to provoked pain. Pt states

she suffered extensive weight loss due to renal injury s/p chemotherapy. 

Oswestry axial pain score of 27.





Interventional pain procedures completed include Celiac Plexus Block, MECHE L2-L3 

x1


Patient is currently on Tyl


Patient denies any side effects of the medication(s), denies excessive 

drowsiness or sleepiness, denies suicidal ideation and reports that the current 

pain medication is  helping to control the  pain and improve activities of daily

living.


Patient denies any motor or sensory deficits. Patient denies any fever or night 

sweats, denies any change in the bowel movements or urination.


 


Physical Examination:


  -Constitutional: Cooperative. Not in acute distress .                         

                                                                                

                                                                                

  


 - Neurologic:  Cranial nerve II to  XII  intact. No focal neurological 

deficits.


 - Psychatric: Alert & oriented x 3. Matching mood & appropriate affect. 

Judgment and insight intact.  


 - Musculoskeletal:     


Cervical spine: 


      Muscle bulk/ tone/ strength in the bilateral upper extremities normal


      Vertebral body tenderness to palpation over 


      Spurling test positive 


      Distraction test positive 


      Facet loading test positive TTP 





Thoracic spine   


     Muscle bulk / tone/ strength in the bilateral paraspinal muscles normal


     Vertebral body tender to palpation over L3


      Rollins Test positive


     Facet loading test positive TTP 


                                                                                

                                                                                

                                                                                

                                                                                

                                                                                

    


Lumbar spine: 


     Motor bulk/ tone/ strength  lower extremities , thigh and legs : 5/5 


     Deep tendon reflexes :   Normal  Knee Jerk. Normal Ankle Jerk  .


     Vertebral body tenderness to palpation over 


     Rollins Test positive


     Lumbar Facet Loading Test  positive 


     Straight Leg Raise: positive at  30  degrees right side/ left side 


     Gaenslen's Test positive  





Sacral spine :


     Severe tenderness over the Sacroiliac joint:  right side / left side 


     Range of motion: Flexion of the lumbar spine <60 degrees


     Range of motion: Extension of the lumbar spine <20 degrees


     Gaenslen's Test positive right side / left side


     Jenna test: positive  right side /  left side


     Thigh Thrust Test positive right side / left side


     Sacral Thrust Test positive right side / left side





Imaging:


MRI non contrast of the lumbar spine from 5/27/23 reviewed





Assessment and plan:


       Chronic LBP secondary to lumbar DDD, spondylosis with facet arthropathy 

without myelopathy


       Recommendation of MECHE L2-L3 #2. May need a series of injections for 

optimal pain relief. Risks, benefits of procedure discussed and pt verbalized 

understanding. Admits to anticoagulant use or medical history of diabetes. 

Protocol for discontinuation/ continuation of medications bobo procedure 

discussed. Minimal anesthesia provided, if clinically indicated, consisting of 

Versed and Fentanyl. All questions answered.





I have spent less than 30 minutes on patient care today. Dr Lynn was 

available by phone for the evaluation of this patient. The time was used to 

review the medical records including relevant urine studies and Prescription 

history (MAPs), review of the available imaging, evaluation and examination of 

the patient, coordination of care with the medical staff and if applicable 

referring physicians, as well as creation of the medical record


  


PQRS Narrative: 


                                        





Smoking Status                   Never smoker








Home Medications: 


Ambulatory Orders





Acetaminophen [Tylenol Extra Strength] 1,000 mg PO AS DIRECTED PRN 02/01/22 


Apixaban [Eliquis] 5 mg PO BID 02/01/22 


Calcium Carbonate [Calcium] 600 mg PO DAILY 02/01/22 


Diphenox-Atrop 2.5-0.025 mg [Lomotil] 1 tab PO AS DIRECTED PRN 02/01/22 


Iron 28 mg PO DAILY 02/01/22 


L.acidoph,Paracasei, B.lactis [Probiotic] 1 each PO DAILY 02/01/22 


Lipase/Protease/Amylase [Creon Dr 24,000 Unit Capsule] 2 capsule PO TID-W/MEALS 

02/01/22 


Loperamide [Imodium] 2 mg PO AS DIRECTED PRN 02/01/22 


Magnesium Oxide [Mag-Ox] 400 mg PO DAILY 02/01/22 


Omeprazole 20 mg PO DAILY 02/01/22 


Folic Acid 1 mg PO DAILY 09/09/22 


Gabapentin [Neurontin] 300 mg PO BID 09/09/22 


Sodium Bicarbonate 650 mg PO BID 09/09/22 


calcitrioL [Calcitriol] 0.5 mcg PO WEEKLY 09/09/22 











Controlled Substance Measures





- Controlled Substance Measures


Is patient prescribed a controlled substance at discharge?: No